# Patient Record
Sex: FEMALE | Race: WHITE | Employment: FULL TIME | URBAN - METROPOLITAN AREA
[De-identification: names, ages, dates, MRNs, and addresses within clinical notes are randomized per-mention and may not be internally consistent; named-entity substitution may affect disease eponyms.]

---

## 2017-01-12 ENCOUNTER — HOSPITAL ENCOUNTER (OUTPATIENT)
Dept: ULTRASOUND IMAGING | Age: 42
Discharge: HOME | End: 2017-01-12
Attending: OBSTETRICS & GYNECOLOGY | Admitting: OBSTETRICS & GYNECOLOGY

## 2017-01-12 DIAGNOSIS — R92.8 ABNORMAL MAMMOGRAM, UNSPECIFIED: ICD-10-CM

## 2017-01-12 DIAGNOSIS — Z09 FOLLOW-UP EXAM, 3-6 MONTHS SINCE PREVIOUS EXAM: ICD-10-CM

## 2017-01-12 DIAGNOSIS — R92.8 ABNORMAL MAMMOGRAM: ICD-10-CM

## 2019-10-16 ENCOUNTER — HOSPITAL ENCOUNTER (OUTPATIENT)
Dept: WOMENS IMAGING | Age: 44
Discharge: HOME | End: 2019-10-16
Payer: COMMERCIAL

## 2019-10-16 DIAGNOSIS — Z12.31 BREAST CANCER SCREENING BY MAMMOGRAM: ICD-10-CM

## 2019-10-16 PROCEDURE — 77063 BREAST TOMOSYNTHESIS BI: CPT

## 2020-07-21 ENCOUNTER — OFFICE VISIT (OUTPATIENT)
Dept: INTERNAL MEDICINE CLINIC | Age: 45
End: 2020-07-21
Payer: COMMERCIAL

## 2020-07-21 VITALS
BODY MASS INDEX: 34.86 KG/M2 | HEIGHT: 68 IN | SYSTOLIC BLOOD PRESSURE: 138 MMHG | WEIGHT: 230 LBS | HEART RATE: 86 BPM | DIASTOLIC BLOOD PRESSURE: 92 MMHG | TEMPERATURE: 98.5 F

## 2020-07-21 PROCEDURE — 99203 OFFICE O/P NEW LOW 30 MIN: CPT | Performed by: NURSE PRACTITIONER

## 2020-07-21 RX ORDER — SERTRALINE HYDROCHLORIDE 100 MG/1
200 TABLET, FILM COATED ORAL DAILY
Qty: 180 TABLET | Refills: 1 | Status: SHIPPED | OUTPATIENT
Start: 2020-07-21 | End: 2021-04-01 | Stop reason: SDUPTHER

## 2020-07-21 RX ORDER — ESCITALOPRAM OXALATE 20 MG/1
20 TABLET ORAL DAILY
COMMUNITY
End: 2021-03-29

## 2020-07-21 SDOH — HEALTH STABILITY: MENTAL HEALTH: HOW OFTEN DO YOU HAVE A DRINK CONTAINING ALCOHOL?: 2-3 TIMES A WEEK

## 2020-07-21 ASSESSMENT — PATIENT HEALTH QUESTIONNAIRE - PHQ9
2. FEELING DOWN, DEPRESSED OR HOPELESS: 0
1. LITTLE INTEREST OR PLEASURE IN DOING THINGS: 0
SUM OF ALL RESPONSES TO PHQ QUESTIONS 1-9: 0
SUM OF ALL RESPONSES TO PHQ9 QUESTIONS 1 & 2: 0
SUM OF ALL RESPONSES TO PHQ QUESTIONS 1-9: 0

## 2020-07-21 NOTE — PROGRESS NOTES
is no abdominal tenderness. There is no guarding or rebound. Musculoskeletal: Normal range of motion. Skin:     General: Skin is warm and dry. Neurological:      Mental Status: She is alert and oriented to person, place, and time. Psychiatric:         Behavior: Behavior normal.         Thought Content: Thought content normal.        BP (!) 138/92   Pulse 86   Temp 98.5 °F (36.9 °C) (Oral)   Ht 5' 7.5\" (1.715 m)   Wt 230 lb (104.3 kg)   BMI 35.49 kg/m²        PHQ Scores 7/21/2020   PHQ2 Score 0   PHQ9 Score 0     Interpretation of Total Score DepressionSeverity: 1-4 = Minimal depression, 5-9 = Mild depression, 10-14 = Moderate depression, 15-19 = Moderately severe depression, 20-27 = Severe depression         ASSESSMENT/PLAN:  Bhavya was seen today for established new doctor. Diagnoses and all orders for this visit:    Encounter to establish care  - Oriented to provider & practice  - Will try to get records from previous PCP    Mixed obsessional thoughts and acts  -     sertraline (ZOLOFT) 100 MG tablet;  Take 2 tablets by mouth daily        LUZ MARIA Esteves - CNP

## 2020-07-22 ASSESSMENT — ENCOUNTER SYMPTOMS
RESPIRATORY NEGATIVE: 1
GASTROINTESTINAL NEGATIVE: 1

## 2020-08-25 ENCOUNTER — HOSPITAL ENCOUNTER (EMERGENCY)
Age: 45
Discharge: HOME | End: 2020-08-25
Payer: COMMERCIAL

## 2020-08-25 VITALS
WEIGHT: 201 LBS | OXYGEN SATURATION: 92 % | DIASTOLIC BLOOD PRESSURE: 101 MMHG | HEART RATE: 93 BPM | RESPIRATION RATE: 18 BRPM | BODY MASS INDEX: 30.46 KG/M2 | SYSTOLIC BLOOD PRESSURE: 154 MMHG | HEIGHT: 68 IN | TEMPERATURE: 98.3 F

## 2020-08-25 PROCEDURE — 99282 EMERGENCY DEPT VISIT SF MDM: CPT

## 2020-08-25 RX ORDER — BACITRACIN, NEOMYCIN, POLYMYXIN B 400; 3.5; 5 [USP'U]/G; MG/G; [USP'U]/G
OINTMENT TOPICAL
Status: DISCONTINUED
Start: 2020-08-25 | End: 2020-08-25 | Stop reason: HOSPADM

## 2020-08-25 ASSESSMENT — PAIN SCALES - GENERAL: PAINLEVEL_OUTOF10: 2

## 2020-08-26 NOTE — ED PROVIDER NOTES
[facility]        Pt Name: Joanna Lee  MRN: [id number]  [place] 1975  Date of evaluation: 8/25/2020  Provider: Nely Mccoy PA-C  PCP: LUZ MARIA Bryan CNP    Evaluation by VIRY. My supervising physician was available for consultation. CHIEF COMPLAINT       Chief Complaint   Patient presents with    Laceration     Pt presents to the ED with right index finger and hand laceration after accidently cutting self on kitchen blade        HISTORY OF PRESENT ILLNESS   (Location, Timing/Onset, Context/Setting, Quality, Duration, Modifying Factors, Severity, Associated Signs and Symptoms)  Note limiting factors. Joanna Lee is a 50 y.o. female that presents to the emergency department with a chief complaint of a laceration to her right hand. She just got a new ninja  and states that she was cleaning it and accidentally touched the blade when she was pulling it out of the sink. She states she is up-to-date on her tetanus but is unsure of exactly what it is but no she is always up-to-date on this. She is right-hand dominant. Denies any other wound besides of her her right hand. Denies numbness or any other symptoms. Rates the pain a 2 out of 10. Nursing Notes were all reviewed and agreed with or any disagreements were addressed in the HPI. REVIEW OF SYSTEMS    (2-9 systems for level 4, 10 or more for level 5)     Review of Systems    Positives and Pertinent negatives as per HPI. Except as noted above in the ROS, all other systems were reviewed and negative.        PAST MEDICAL HISTORY     Past Medical History:   Diagnosis Date    Cancer ([address]     skin    Fibroid uterus     Obsessive compulsive disorder          SURGICAL HISTORY     Past Surgical History:   Procedure Laterality Date    FRACTURE SURGERY      right ankle and toe    HYSTERECTOMY  09/09/2016    Robotic total hysterectomy, L salpingectomy & R salpingectomy-oopherectomy    SKIN CANCER EXCISION           CURRENTMEDICATIONS       Discharge Medication List as of 8/25/2020  9:00 PM      CONTINUE these medications which have NOT CHANGED    Details   sertraline (ZOLOFT) 100 MG tablet Take 2 tablets by mouth daily, Disp-180 tablet,R-1Normal      escitalopram (LEXAPRO) 20 MG tablet Take 20 mg by mouth dailyHistorical Med               ALLERGIES     Morphine and Keflex [cephalexin]    FAMILYHISTORY       Family History   Problem Relation Age of Onset    Diabetes Mother     High Blood Pressure Mother     Cancer Mother         breast    Diabetes Father           SOCIAL HISTORY       Social History     Tobacco Use    Smoking status: Never Smoker    Smokeless tobacco: Never Used   Substance Use Topics    Alcohol use: Yes     Frequency: 2-3 times a week     Comment: soc    Drug use: No       SCREENINGS             PHYSICAL EXAM    (up to 7 for level 4, 8 or more for level 5)     ED Triage Vitals   BP Temp Temp Source Pulse Resp SpO2 Height Weight   08/25/20 1951 08/25/20 1949 08/25/20 1949 08/25/20 1949 08/25/20 1949 08/25/20 1949 08/25/20 1949 08/25/20 1949   (!) 154/101 98.3 °F (36.8 °C) Oral 93 18 92 % 5' 8\" (1.727 m) 201 lb (91.2 kg)       Physical Exam  Vitals signs and nursing note reviewed. Constitutional:       Appearance: She is well-developed. She is not diaphoretic. HENT:      Head: Atraumatic. Nose: Nose normal.   Eyes:      General:         Right eye: No discharge. Left eye: No discharge. Neck:      Musculoskeletal: Normal range of motion. Cardiovascular:      Rate and Rhythm: Normal rate. Musculoskeletal: Normal range of motion. Comments: There is a 3.1 cm laceration on the palmar aspect of the right hand that extends over the area of the distal right second metacarpal over the MCP and into the area of the right second proximal phalanges.   No obvious tendon laceration or foreign body in a bloodless field and full range of motion. Full range of motion against resistance with right second DIP, PIP, MCP. Capillary refill brisk. Skin:     General: Skin is warm and dry. Findings: No erythema or rash. Neurological:      Mental Status: She is alert and oriented to person, place, and time. Cranial Nerves: No cranial nerve deficit. Psychiatric:         Behavior: Behavior normal.         DIAGNOSTIC RESULTS   LABS:    Labs Reviewed - No data to display    All other labs were within normal range or not returned as of this dictation. EKG: All EKG's are interpreted by the Emergency Department Physician in the absence of a cardiologist.  Please see their note for interpretation of EKG. RADIOLOGY:   Non-plain film images such as CT, Ultrasound and MRI are read by the radiologist. Plain radiographic images are visualized and preliminarily interpreted by the ED Provider with the below findings:        Interpretation per the Radiologist below, if available at the time of this note:    No orders to display     No results found. PROCEDURES   Unless otherwise noted below, none     Lac Repair  Performed by: Kulwinder Meehan PA-C  Authorized by: Kulwinder Meehan PA-C     Consent:     Consent obtained:  Verbal    Consent given by:  Patient    Risks discussed:  Infection, pain, retained foreign body, tendon damage, poor cosmetic result, need for additional repair and poor wound healing  Anesthesia (see MAR for exact dosages):      Anesthesia method:  Local infiltration    Local anesthetic:  Lidocaine 1% w/o epi  Laceration details:     Location:  Hand    Hand location:  R palm    Length (cm):  3.1  Repair type:     Repair type:  Simple  Pre-procedure details:     Preparation:  Patient was prepped and draped in usual sterile fashion  Exploration:     Wound exploration: wound explored through full range of motion and entire depth of wound probed and visualized    Treatment:     Area cleansed with: Saline    Amount of cleaning:  Standard    Irrigation solution:  Sterile saline  Skin repair:     Repair method:  Sutures    Suture size:  5-0    Suture material:  Nylon    Suture technique:  Simple interrupted    Number of sutures:  5  Approximation:     Approximation:  Close  Post-procedure details:     Dressing: Wound care with antibiotic ointment and sterile dressing provided by nursing staff. Patient tolerance of procedure: Tolerated well, no immediate complications        CRITICAL CARE TIME   N/A    CONSULTS:  None      EMERGENCY DEPARTMENT COURSE and DIFFERENTIAL DIAGNOSIS/MDM:   Vitals:    Vitals:    08/25/20 1949 08/25/20 1951   BP:  (!) 154/101   Pulse: 93    Resp: 18    Temp: 98.3 °F (36.8 °C)    TempSrc: Oral    SpO2: 92%    Weight: 201 lb (91.2 kg)    Height: 5' 8\" (1.727 m)        Patient was given the following medications:  Medications   neomycin-bacitracin-polymyxin (NEOSPORIN) 400-5-5000 ointment (has no administration in time range)           Patient presented with laceration of the right hand. Low suspicion for foreign body, tendon laceration, acute fracture or other emergent etiology. Wound easily approximated with simple sutures. She was educated to have these removed in about 1 week and educated on suture care at home. Will follow-up as an outpatient return here for any worsening of symptoms or problems at home. Patient was stable time of discharge. FINAL IMPRESSION      1.  Laceration of right hand without foreign body, initial encounter          DISPOSITION/PLAN   DISPOSITION Decision To Discharge 08/25/2020 08:57:38 PM      PATIENT REFERREDTO:  LUZ MARIA Hedrick - CNP  [facility]  [phone]    Schedule an appointment as soon as possible for a visit in 1 week  For suture removal    [facility] Emergency Department  [address] [place] [address]  [phone]    As needed      DISCHARGE MEDICATIONS:  Discharge Medication List as of 8/25/2020  9:00 PM          DISCONTINUED MEDICATIONS:  Discharge Medication List as of 8/25/2020  9:00 PM                 (Please note that portions of this note were completed with a voice recognition program.  Efforts were made to edit the dictations but occasionally words are mis-transcribed.)    Jessica Magallanes PA-C (electronically signed)           Jessica Magallanes PA-C  08/25/20 [id number]

## 2020-11-19 ENCOUNTER — OFFICE VISIT (OUTPATIENT)
Dept: PRIMARY CARE CLINIC | Age: 45
End: 2020-11-19
Payer: COMMERCIAL

## 2020-11-19 PROCEDURE — 99211 OFF/OP EST MAY X REQ PHY/QHP: CPT | Performed by: NURSE PRACTITIONER

## 2020-11-19 NOTE — PROGRESS NOTES
Bhavya Wilburn received a viral test for COVID-19. They were educated on isolation and quarantine as appropriate. For any symptoms, they were directed to seek care from their PCP, given contact information to establish with a doctor, directed to an urgent care or the emergency room.

## 2020-11-19 NOTE — PATIENT INSTRUCTIONS

## 2020-11-20 LAB — SARS-COV-2, PCR: NOT DETECTED

## 2021-03-29 ENCOUNTER — OFFICE VISIT (OUTPATIENT)
Dept: INTERNAL MEDICINE CLINIC | Age: 46
End: 2021-03-29
Payer: COMMERCIAL

## 2021-03-29 VITALS
TEMPERATURE: 97.5 F | WEIGHT: 243 LBS | BODY MASS INDEX: 36.95 KG/M2 | DIASTOLIC BLOOD PRESSURE: 80 MMHG | SYSTOLIC BLOOD PRESSURE: 144 MMHG | HEART RATE: 80 BPM | OXYGEN SATURATION: 98 %

## 2021-03-29 DIAGNOSIS — Z00.00 ANNUAL PHYSICAL EXAM: Primary | ICD-10-CM

## 2021-03-29 DIAGNOSIS — E66.09 CLASS 2 OBESITY DUE TO EXCESS CALORIES WITHOUT SERIOUS COMORBIDITY WITH BODY MASS INDEX (BMI) OF 36.0 TO 36.9 IN ADULT: ICD-10-CM

## 2021-03-29 DIAGNOSIS — Z80.3 FAMILY HISTORY OF BREAST CANCER: ICD-10-CM

## 2021-03-29 DIAGNOSIS — G47.30 SLEEP APNEA, UNSPECIFIED TYPE: ICD-10-CM

## 2021-03-29 DIAGNOSIS — Z12.31 ENCOUNTER FOR SCREENING MAMMOGRAM FOR MALIGNANT NEOPLASM OF BREAST: ICD-10-CM

## 2021-03-29 PROCEDURE — 99396 PREV VISIT EST AGE 40-64: CPT | Performed by: NURSE PRACTITIONER

## 2021-03-29 ASSESSMENT — PATIENT HEALTH QUESTIONNAIRE - PHQ9
SUM OF ALL RESPONSES TO PHQ9 QUESTIONS 1 & 2: 1
2. FEELING DOWN, DEPRESSED OR HOPELESS: 0
1. LITTLE INTEREST OR PLEASURE IN DOING THINGS: 1
SUM OF ALL RESPONSES TO PHQ QUESTIONS 1-9: 1

## 2021-03-29 NOTE — PROGRESS NOTES
SUBJECTIVE:    Patient ID: Lucyann Aschoff is a 48 y.o. female. CC: Annual physical, possible sleep apnea, obesity    HPI: The patient presents to the office today for annual physical examination and follow-up of chronic medical conditions. She is interested in having her annual blood work done today. She is concerned about possible sleep apnea. She reports her  has noticed periods of her stop and sleeping and snoring at night. She has a history of obesity. She is interested in weight loss options. Past Medical History:   Diagnosis Date    [address] ([facility] [address]     skin    Fibroid uterus     Obsessive compulsive disorder         Current Outpatient Medications   Medication Sig Dispense Refill    sertraline (ZOLOFT) 100 MG tablet Take 2 tablets by mouth daily 180 tablet 1     No current facility-administered medications for this visit. Review of Systems   Constitutional: Negative. Respiratory: Negative. Cardiovascular: Negative. Genitourinary: Negative. Musculoskeletal: Negative. Neurological: Negative. Psychiatric/Behavioral: Negative. All other systems reviewed and are negative. OBJECTIVE:  Physical Exam  Vitals signs reviewed. Constitutional:       General: She is not in acute distress. Appearance: She is well-developed. She is not diaphoretic. HENT:      Head: Normocephalic and atraumatic. Eyes:      General: No scleral icterus. Conjunctiva/sclera: Conjunctivae normal.   Neck:      Musculoskeletal: Neck supple. Vascular: No JVD. Cardiovascular:      Rate and Rhythm: Normal rate and regular rhythm. Pulmonary:      Effort: Pulmonary effort is normal. No respiratory distress. Breath sounds: Normal breath sounds. No wheezing. Abdominal:      General: There is no distension. Palpations: Abdomen is soft. Tenderness: There is no abdominal tenderness. There is no guarding or rebound.    Musculoskeletal: Normal range of motion. Skin:     General: Skin is warm and dry. Neurological:      Mental Status: She is alert and oriented to person, place, and time. Psychiatric:         Behavior: Behavior normal.         Thought Content: Thought content normal.        BP (!) 144/80 (Cuff Size: Large Adult)   Pulse 80   Temp 97.5 °F (36.4 °C)   Wt 243 lb (110.2 kg)   LMP 08/20/2016   SpO2 98%   BMI 36.95 kg/m²      PHQ Scores 3/29/2021 7/21/2020   PHQ2 Score 1 0   PHQ9 Score 1 0     Interpretation of Total Score Depression Severity: 1-4 = Minimal depression, 5-9 = Mild depression, 10-14 = Moderate depression, 15-19 = Moderately severe depression, 20-27 =Severe depression        ASSESSMENT/PLAN:  Bhavya was seen today for sleep apnea and weight loss. Diagnoses and all orders for this visit:    Annual physical exam  -     COMPREHENSIVE METABOLIC PANEL; Future  -     LIPID PANEL; Future  -     Hemoglobin A1C; Future  -     TSH with Reflex; Future  -     QUINTON DIGITAL SCREEN W OR WO CAD BILATERAL; Future  -     CBC WITH AUTO DIFFERENTIAL; Future  -     HIV Screen; Future  -     Hepatitis C Antibody; Future    Sleep apnea, unspecified type  -     Jeanie Kingsley MD, Sleep Medicine, [facility]    Class 2 obesity due to excess calories without serious comorbidity with body mass index (BMI) of 36.0 to 36.9 in adult  -     Jeanie Kingsley MD, Sleep Medicine, [facility]    Encounter for screening mammogram for malignant neoplasm of breast  -     QUINTON DIGITAL SCREEN W OR WO CAD BILATERAL; Future    Family history of breast cancer  -     QUINTON DIGITAL SCREEN W OR WO CAD BILATERAL;  Future        LUZ MARIA Apple - CNP

## 2021-03-31 PROBLEM — E66.812 CLASS 2 OBESITY DUE TO EXCESS CALORIES WITHOUT SERIOUS COMORBIDITY WITH BODY MASS INDEX (BMI) OF 36.0 TO 36.9 IN ADULT: Status: ACTIVE | Noted: 2021-03-31

## 2021-03-31 PROBLEM — G47.30 SLEEP APNEA: Status: ACTIVE | Noted: 2021-03-31

## 2021-03-31 PROBLEM — E66.09 CLASS 2 OBESITY DUE TO EXCESS CALORIES WITHOUT SERIOUS COMORBIDITY WITH BODY MASS INDEX (BMI) OF 36.0 TO 36.9 IN ADULT: Status: ACTIVE | Noted: 2021-03-31

## 2021-03-31 ASSESSMENT — ENCOUNTER SYMPTOMS: RESPIRATORY NEGATIVE: 1

## 2021-04-01 DIAGNOSIS — F42.2 MIXED OBSESSIONAL THOUGHTS AND ACTS: ICD-10-CM

## 2021-04-01 RX ORDER — SERTRALINE HYDROCHLORIDE 100 MG/1
200 TABLET, FILM COATED ORAL DAILY
Qty: 180 TABLET | Refills: 1 | Status: SHIPPED | OUTPATIENT
Start: 2021-04-01 | End: 2021-11-23

## 2021-04-02 DIAGNOSIS — Z00.00 ANNUAL PHYSICAL EXAM: ICD-10-CM

## 2021-04-02 LAB
A/G RATIO: 2.1 (ref 1.1–2.2)
ALBUMIN SERPL-MCNC: 4.8 G/DL (ref 3.4–5)
ALP BLD-CCNC: 71 U/L (ref 40–129)
ALT SERPL-CCNC: 27 U/L (ref 10–40)
ANION GAP SERPL CALCULATED.3IONS-SCNC: 11 MMOL/L (ref 3–16)
AST SERPL-CCNC: 17 U/L (ref 15–37)
BASOPHILS ABSOLUTE: 0.1 K/UL (ref 0–0.2)
BASOPHILS RELATIVE PERCENT: 0.9 %
BILIRUB SERPL-MCNC: 0.8 MG/DL (ref 0–1)
BUN BLDV-MCNC: 7 MG/DL (ref 7–20)
CALCIUM SERPL-MCNC: 9.7 MG/DL (ref 8.3–10.6)
CHLORIDE BLD-SCNC: 102 MMOL/L (ref 99–110)
CHOLESTEROL, TOTAL: 165 MG/DL (ref 0–199)
CO2: 29 MMOL/L (ref 21–32)
CREAT SERPL-MCNC: 0.7 MG/DL (ref 0.6–1.1)
EOSINOPHILS ABSOLUTE: 0.2 K/UL (ref 0–0.6)
EOSINOPHILS RELATIVE PERCENT: 1.9 %
GFR AFRICAN AMERICAN: >60
GFR NON-AFRICAN AMERICAN: >60
GLOBULIN: 2.3 G/DL
GLUCOSE BLD-MCNC: 95 MG/DL (ref 70–99)
HCT VFR BLD CALC: 44.8 % (ref 36–48)
HDLC SERPL-MCNC: 53 MG/DL (ref 40–60)
HEMOGLOBIN: 15 G/DL (ref 12–16)
HEPATITIS C ANTIBODY INTERPRETATION: NORMAL
LDL CHOLESTEROL CALCULATED: 92 MG/DL
LYMPHOCYTES ABSOLUTE: 1.6 K/UL (ref 1–5.1)
LYMPHOCYTES RELATIVE PERCENT: 20.2 %
MCH RBC QN AUTO: 31.8 PG (ref 26–34)
MCHC RBC AUTO-ENTMCNC: 33.6 G/DL (ref 31–36)
MCV RBC AUTO: 94.7 FL (ref 80–100)
MONOCYTES ABSOLUTE: 0.6 K/UL (ref 0–1.3)
MONOCYTES RELATIVE PERCENT: 7.4 %
NEUTROPHILS ABSOLUTE: 5.6 K/UL (ref 1.7–7.7)
NEUTROPHILS RELATIVE PERCENT: 69.6 %
PDW BLD-RTO: 13.3 % (ref 12.4–15.4)
PLATELET # BLD: 258 K/UL (ref 135–450)
PMV BLD AUTO: 9 FL (ref 5–10.5)
POTASSIUM SERPL-SCNC: 4.3 MMOL/L (ref 3.5–5.1)
RBC # BLD: 4.74 M/UL (ref 4–5.2)
SODIUM BLD-SCNC: 142 MMOL/L (ref 136–145)
TOTAL PROTEIN: 7.1 G/DL (ref 6.4–8.2)
TRIGL SERPL-MCNC: 100 MG/DL (ref 0–150)
TSH REFLEX: 3.54 UIU/ML (ref 0.27–4.2)
VLDLC SERPL CALC-MCNC: 20 MG/DL
WBC # BLD: 8 K/UL (ref 4–11)

## 2021-04-03 LAB
ESTIMATED AVERAGE GLUCOSE: 91.1 MG/DL
HBA1C MFR BLD: 4.8 %
HIV AG/AB: NORMAL
HIV ANTIGEN: NORMAL
HIV-1 ANTIBODY: NORMAL
HIV-2 AB: NORMAL

## 2021-06-08 ENCOUNTER — TELEPHONE (OUTPATIENT)
Dept: PULMONOLOGY | Age: 46
End: 2021-06-08

## 2021-07-13 ENCOUNTER — HOSPITAL ENCOUNTER (OUTPATIENT)
Dept: WOMENS IMAGING | Age: 46
Discharge: HOME | End: 2021-07-13
Payer: COMMERCIAL

## 2021-07-13 DIAGNOSIS — Z80.3 FAMILY HISTORY OF BREAST CANCER: ICD-10-CM

## 2021-07-13 DIAGNOSIS — Z12.31 ENCOUNTER FOR SCREENING MAMMOGRAM FOR MALIGNANT NEOPLASM OF BREAST: ICD-10-CM

## 2021-07-13 DIAGNOSIS — Z00.00 ANNUAL PHYSICAL EXAM: ICD-10-CM

## 2021-07-13 PROCEDURE — 77063 BREAST TOMOSYNTHESIS BI: CPT

## 2022-02-21 ENCOUNTER — E-VISIT (OUTPATIENT)
Dept: INTERNAL MEDICINE CLINIC | Age: 47
End: 2022-02-21
Payer: COMMERCIAL

## 2022-02-21 DIAGNOSIS — J06.9 UPPER RESPIRATORY TRACT INFECTION, UNSPECIFIED TYPE: Primary | ICD-10-CM

## 2022-02-21 PROCEDURE — 99421 OL DIG E/M SVC 5-10 MIN: CPT | Performed by: NURSE PRACTITIONER

## 2022-02-21 ASSESSMENT — LIFESTYLE VARIABLES: SMOKING_STATUS: NO, I'VE NEVER SMOKED

## 2022-02-21 NOTE — PROGRESS NOTES
URI symptoms for 3 days. No fever. Neg COVID testing per her report. Most likely viral illness. Dx: URI    Rx: Treat your symptoms. Rest, fluids, Tylenol cold and cough, salt water gargles, sinus rinses, etc.     If symptoms are persisting for more than 7 days, please let me know and we can try antibiotics for bacterial infection but I would not suggest that at this point as they will not help a virus.       Time spend on e-visit encounter: 8m

## 2022-04-29 ENCOUNTER — OFFICE VISIT (OUTPATIENT)
Dept: INTERNAL MEDICINE CLINIC | Age: 47
End: 2022-04-29
Payer: COMMERCIAL

## 2022-04-29 VITALS
HEIGHT: 68 IN | DIASTOLIC BLOOD PRESSURE: 80 MMHG | HEART RATE: 84 BPM | OXYGEN SATURATION: 98 % | BODY MASS INDEX: 42.59 KG/M2 | WEIGHT: 281 LBS | SYSTOLIC BLOOD PRESSURE: 132 MMHG

## 2022-04-29 DIAGNOSIS — E66.01 CLASS 3 SEVERE OBESITY DUE TO EXCESS CALORIES WITHOUT SERIOUS COMORBIDITY WITH BODY MASS INDEX (BMI) OF 40.0 TO 44.9 IN ADULT (HCC): ICD-10-CM

## 2022-04-29 DIAGNOSIS — Z00.00 ANNUAL PHYSICAL EXAM: ICD-10-CM

## 2022-04-29 DIAGNOSIS — F42.2 MIXED OBSESSIONAL THOUGHTS AND ACTS: ICD-10-CM

## 2022-04-29 DIAGNOSIS — Z00.00 ANNUAL PHYSICAL EXAM: Primary | ICD-10-CM

## 2022-04-29 LAB
A/G RATIO: 2.2 (ref 1.1–2.2)
ALBUMIN SERPL-MCNC: 4.6 G/DL (ref 3.4–5)
ALP BLD-CCNC: 69 U/L (ref 40–129)
ALT SERPL-CCNC: 38 U/L (ref 10–40)
ANION GAP SERPL CALCULATED.3IONS-SCNC: 15 MMOL/L (ref 3–16)
AST SERPL-CCNC: 19 U/L (ref 15–37)
BASOPHILS ABSOLUTE: 0.1 K/UL (ref 0–0.2)
BASOPHILS RELATIVE PERCENT: 0.8 %
BILIRUB SERPL-MCNC: 0.3 MG/DL (ref 0–1)
BUN BLDV-MCNC: 14 MG/DL (ref 7–20)
CALCIUM SERPL-MCNC: 10 MG/DL (ref 8.3–10.6)
CHLORIDE BLD-SCNC: 104 MMOL/L (ref 99–110)
CHOLESTEROL, TOTAL: 172 MG/DL (ref 0–199)
CO2: 23 MMOL/L (ref 21–32)
CREAT SERPL-MCNC: 0.8 MG/DL (ref 0.6–1.1)
EOSINOPHILS ABSOLUTE: 0.2 K/UL (ref 0–0.6)
EOSINOPHILS RELATIVE PERCENT: 3.4 %
GFR AFRICAN AMERICAN: >60
GFR NON-AFRICAN AMERICAN: >60
GLUCOSE BLD-MCNC: 104 MG/DL (ref 70–99)
HCT VFR BLD CALC: 41.3 % (ref 36–48)
HDLC SERPL-MCNC: 35 MG/DL (ref 40–60)
HEMOGLOBIN: 14 G/DL (ref 12–16)
LDL CHOLESTEROL CALCULATED: 105 MG/DL
LYMPHOCYTES ABSOLUTE: 1.7 K/UL (ref 1–5.1)
LYMPHOCYTES RELATIVE PERCENT: 27 %
MCH RBC QN AUTO: 30.7 PG (ref 26–34)
MCHC RBC AUTO-ENTMCNC: 33.9 G/DL (ref 31–36)
MCV RBC AUTO: 90.6 FL (ref 80–100)
MONOCYTES ABSOLUTE: 0.4 K/UL (ref 0–1.3)
MONOCYTES RELATIVE PERCENT: 6.3 %
NEUTROPHILS ABSOLUTE: 4 K/UL (ref 1.7–7.7)
NEUTROPHILS RELATIVE PERCENT: 62.5 %
PDW BLD-RTO: 13.6 % (ref 12.4–15.4)
PLATELET # BLD: 259 K/UL (ref 135–450)
PMV BLD AUTO: 8.4 FL (ref 5–10.5)
POTASSIUM SERPL-SCNC: 4.7 MMOL/L (ref 3.5–5.1)
RBC # BLD: 4.56 M/UL (ref 4–5.2)
SODIUM BLD-SCNC: 142 MMOL/L (ref 136–145)
TOTAL PROTEIN: 6.7 G/DL (ref 6.4–8.2)
TRIGL SERPL-MCNC: 161 MG/DL (ref 0–150)
TSH REFLEX: 3.28 UIU/ML (ref 0.27–4.2)
VLDLC SERPL CALC-MCNC: 32 MG/DL
WBC # BLD: 6.4 K/UL (ref 4–11)

## 2022-04-29 PROCEDURE — 99396 PREV VISIT EST AGE 40-64: CPT | Performed by: NURSE PRACTITIONER

## 2022-04-29 RX ORDER — BUPROPION HYDROCHLORIDE 150 MG/1
150 TABLET, EXTENDED RELEASE ORAL 2 TIMES DAILY
Qty: 60 TABLET | Refills: 5 | Status: SHIPPED
Start: 2022-04-29 | End: 2022-06-03 | Stop reason: DRUGHIGH

## 2022-04-29 ASSESSMENT — PATIENT HEALTH QUESTIONNAIRE - PHQ9
SUM OF ALL RESPONSES TO PHQ QUESTIONS 1-9: 13
4. FEELING TIRED OR HAVING LITTLE ENERGY: 2
SUM OF ALL RESPONSES TO PHQ QUESTIONS 1-9: 13
2. FEELING DOWN, DEPRESSED OR HOPELESS: 2
6. FEELING BAD ABOUT YOURSELF - OR THAT YOU ARE A FAILURE OR HAVE LET YOURSELF OR YOUR FAMILY DOWN: 2
1. LITTLE INTEREST OR PLEASURE IN DOING THINGS: 2
10. IF YOU CHECKED OFF ANY PROBLEMS, HOW DIFFICULT HAVE THESE PROBLEMS MADE IT FOR YOU TO DO YOUR WORK, TAKE CARE OF THINGS AT HOME, OR GET ALONG WITH OTHER PEOPLE: 2
8. MOVING OR SPEAKING SO SLOWLY THAT OTHER PEOPLE COULD HAVE NOTICED. OR THE OPPOSITE, BEING SO FIGETY OR RESTLESS THAT YOU HAVE BEEN MOVING AROUND A LOT MORE THAN USUAL: 1
7. TROUBLE CONCENTRATING ON THINGS, SUCH AS READING THE NEWSPAPER OR WATCHING TELEVISION: 0
SUM OF ALL RESPONSES TO PHQ QUESTIONS 1-9: 13
5. POOR APPETITE OR OVEREATING: 2
3. TROUBLE FALLING OR STAYING ASLEEP: 2
SUM OF ALL RESPONSES TO PHQ QUESTIONS 1-9: 13
9. THOUGHTS THAT YOU WOULD BE BETTER OFF DEAD, OR OF HURTING YOURSELF: 0
SUM OF ALL RESPONSES TO PHQ9 QUESTIONS 1 & 2: 4

## 2022-04-29 NOTE — PROGRESS NOTES
SUBJECTIVE:    Patient ID: Davion Olivarez is a 50 y.o. female. CC: Annual physical, weight gain, poor mood    HPI: The patient presents to the office today for annual physical examination and with concerns about weight gain. She also reports poor mood. She reports weight gain since her hysterectomy. She admits she is not exercising. She has tried numerous weight loss techniques including Noom, Adipex, weight watchers, high protein/low carb diet. She admits to poor mood. She is taking sertraline. She feels her mood is largely driven by her weight. Past Medical History:   Diagnosis Date    [address] [address]     skin    Fibroid uterus     Obsessive compulsive disorder         Current Outpatient Medications   Medication Sig Dispense Refill    sertraline (ZOLOFT) 100 MG tablet TAKE TWO TABLETS BY MOUTH DAILY 60 tablet 5     No current facility-administered medications for this visit. Review of Systems   Constitutional: Positive for unexpected weight change. Negative for chills and fever. Respiratory: Negative. Cardiovascular: Negative. Gastrointestinal: Negative. Genitourinary: Negative. Musculoskeletal: Negative. Skin: Negative. Neurological: Negative. Psychiatric/Behavioral: Positive for dysphoric mood. OBJECTIVE:  Physical Exam  Vitals reviewed. Constitutional:       General: She is not in acute distress. Appearance: She is well-developed. She is not diaphoretic. HENT:      Head: Normocephalic and atraumatic. Eyes:      General: No scleral icterus. Conjunctiva/sclera: Conjunctivae normal.   Neck:      Vascular: No JVD. Cardiovascular:      Rate and Rhythm: Normal rate and regular rhythm. Pulmonary:      Effort: Pulmonary effort is normal. No respiratory distress. Breath sounds: Normal breath sounds. No wheezing. Abdominal:      General: There is no distension. Palpations: Abdomen is soft. Tenderness:  There is no abdominal tenderness. There is no guarding or rebound. Musculoskeletal:         General: Normal range of motion. Cervical back: Neck supple. Skin:     General: Skin is warm and dry. Neurological:      Mental Status: She is alert and oriented to person, place, and time. Psychiatric:         Behavior: Behavior normal.         Thought Content: Thought content normal.        /80   Pulse 84   Ht 5' 8\" (1.727 m)   Wt 281 lb (127.5 kg)   LMP 08/20/2016   SpO2 98%   BMI 42.73 kg/m²      PHQ Scores 4/29/2022 3/29/2021 7/21/2020   PHQ2 Score 4 1 0   PHQ9 Score 13 1 0     Interpretation of Total Score Depression Severity: 1-4 = Minimal depression, 5-9 = Mild depression, 10-14 = Moderate depression, 15-19 = Moderately severe depression, 20-27 =Severe depression      ASSESSMENT/PLAN:  Bhavya was seen today for weight loss. Diagnoses and all orders for this visit:    Annual physical exam  -     Comprehensive Metabolic Panel; Future  -     LIPID PANEL; Future  -     Hemoglobin A1C; Future  -     TSH with Reflex; Future  -     CBC with Auto Differential; Future  -     buPROPion (WELLBUTRIN SR) 150 MG extended release tablet; Take 1 tablet by mouth 2 times daily    Mixed obsessional thoughts and acts  -     Comprehensive Metabolic Panel; Future  -     LIPID PANEL; Future  -     Hemoglobin A1C; Future  -     TSH with Reflex; Future  -     CBC with Auto Differential; Future  -     Ambulatory referral to Psychology    Class 3 severe obesity due to excess calories without serious comorbidity with body mass index (BMI) of 40.0 to 44.9 in [facility])  -     Comprehensive Metabolic Panel; Future  -     LIPID PANEL; Future  -     Hemoglobin A1C; Future  -     TSH with Reflex; Future  -     CBC with Auto Differential; Future  -     buPROPion (WELLBUTRIN SR) 150 MG extended release tablet;  Take 1 tablet by mouth 2 times daily  -     Ambulatory referral to Psychology        LUZ MARIA Garner - CNP

## 2022-04-30 LAB
ESTIMATED AVERAGE GLUCOSE: 102.5 MG/DL
HBA1C MFR BLD: 5.2 %

## 2022-05-16 ASSESSMENT — ENCOUNTER SYMPTOMS
GASTROINTESTINAL NEGATIVE: 1
RESPIRATORY NEGATIVE: 1

## 2022-06-03 ENCOUNTER — OFFICE VISIT (OUTPATIENT)
Dept: INTERNAL MEDICINE CLINIC | Age: 47
End: 2022-06-03
Payer: COMMERCIAL

## 2022-06-03 VITALS
WEIGHT: 270 LBS | SYSTOLIC BLOOD PRESSURE: 136 MMHG | OXYGEN SATURATION: 98 % | HEIGHT: 68 IN | DIASTOLIC BLOOD PRESSURE: 88 MMHG | BODY MASS INDEX: 40.92 KG/M2 | HEART RATE: 81 BPM

## 2022-06-03 DIAGNOSIS — E66.01 CLASS 3 SEVERE OBESITY DUE TO EXCESS CALORIES WITHOUT SERIOUS COMORBIDITY WITH BODY MASS INDEX (BMI) OF 40.0 TO 44.9 IN ADULT (HCC): ICD-10-CM

## 2022-06-03 DIAGNOSIS — F33.0 MILD EPISODE OF RECURRENT MAJOR DEPRESSIVE DISORDER (HCC): Primary | ICD-10-CM

## 2022-06-03 PROCEDURE — 99213 OFFICE O/P EST LOW 20 MIN: CPT | Performed by: NURSE PRACTITIONER

## 2022-06-03 RX ORDER — BUPROPION HYDROCHLORIDE 300 MG/1
300 TABLET ORAL EVERY MORNING
Qty: 90 TABLET | Refills: 3 | Status: SHIPPED
Start: 2022-06-03 | End: 2022-09-20 | Stop reason: SINTOL

## 2022-06-03 SDOH — ECONOMIC STABILITY: FOOD INSECURITY: WITHIN THE PAST 12 MONTHS, THE FOOD YOU BOUGHT JUST DIDN'T LAST AND YOU DIDN'T HAVE MONEY TO GET MORE.: NEVER TRUE

## 2022-06-03 SDOH — ECONOMIC STABILITY: FOOD INSECURITY: WITHIN THE PAST 12 MONTHS, YOU WORRIED THAT YOUR FOOD WOULD RUN OUT BEFORE YOU GOT MONEY TO BUY MORE.: NEVER TRUE

## 2022-06-03 ASSESSMENT — SOCIAL DETERMINANTS OF HEALTH (SDOH): HOW HARD IS IT FOR YOU TO PAY FOR THE VERY BASICS LIKE FOOD, HOUSING, MEDICAL CARE, AND HEATING?: NOT HARD AT ALL

## 2022-06-03 NOTE — PROGRESS NOTES
SUBJECTIVE:    Patient ID: Rohini Wilkinson is a 50 y.o. female. CC: Obesity, depression    HPI: The patient presents to the office today for 6-week follow-up of depression and obesity. She was seen about 6 weeks ago for annual physical examination. At that time, she reported concerns about her mood and obesity. We discussed a trial of Wellbutrin as it could help with both mood and weight. She was interested in trying this. The patient reports improvement of both her mood and weight. She has lost 11 pounds in the past 6 weeks. She denies any side effects to Wellbutrin. She is interested in increasing the dose. Past Medical History:   Diagnosis Date    Cancer ([facility] [address]     skin    Fibroid uterus     Obsessive compulsive disorder         Current Outpatient Medications   Medication Sig Dispense Refill    buPROPion (WELLBUTRIN SR) 150 MG extended release tablet Take 1 tablet by mouth 2 times daily 60 tablet 5     No current facility-administered medications for this visit. Review of Systems   Constitutional: Negative. Respiratory: Negative. Cardiovascular: Negative. Gastrointestinal: Negative. Genitourinary: Negative. Musculoskeletal: Negative. Neurological: Negative. Psychiatric/Behavioral: Negative. OBJECTIVE:  Physical Exam  Constitutional:       Appearance: Normal appearance. HENT:      Head: Normocephalic and atraumatic. Pulmonary:      Effort: Pulmonary effort is normal. No respiratory distress. Skin:     General: Skin is warm and dry. Neurological:      General: No focal deficit present. Mental Status: She is alert and oriented to person, place, and time.    Psychiatric:         Mood and Affect: Mood normal.         Behavior: Behavior normal.        /88 (Site: Left Upper Arm, Position: Sitting, Cuff Size: Large Adult)   Pulse 81   Ht 5' 8\" (1.727 m)   Wt 270 lb (122.5 kg)   LMP 08/20/2016   SpO2 98%   Breastfeeding No   BMI 41.05 kg/m²      PHQ Scores 4/29/2022 3/29/2021 7/21/2020   PHQ2 Score 4 1 0   PHQ9 Score 13 1 0     Interpretation of Total Score Depression Severity: 1-4 = Minimal depression, 5-9 = Mild depression, 10-14 = Moderate depression, 15-19 = Moderately severe depression, 20-27 =Severe depression        Wt Readings from Last 3 Encounters:   06/03/22 270 lb (122.5 kg)   04/29/22 281 lb (127.5 kg)   03/29/21 243 lb (110.2 kg)         ASSESSMENT/PLAN:  Bhavya was seen today for follow-up and weight loss. Diagnoses and all orders for this visit:    Mild episode of recurrent major depressive disorder (HCC)  -     buPROPion (WELLBUTRIN XL) 300 MG extended release tablet; Take 1 tablet by mouth every morning    Class 3 severe obesity due to excess calories without serious comorbidity with body mass index (BMI) of 40.0 to 44.9 in adult (HCC)  -     buPROPion (WELLBUTRIN XL) 300 MG extended release tablet; Take 1 tablet by mouth every morning    -Improvement in mood on Wellbutrin. She would like to increase dose  -11 pound weight loss since starting Wellbutrin. She would like to increase dose    Increase Wellbutrin from 150 mg daily to 300 mg daily.         LUZ MARIA Beltran - CNP

## 2022-06-14 PROBLEM — E66.813 CLASS 3 SEVERE OBESITY DUE TO EXCESS CALORIES WITHOUT SERIOUS COMORBIDITY WITH BODY MASS INDEX (BMI) OF 40.0 TO 44.9 IN ADULT: Status: ACTIVE | Noted: 2021-03-31

## 2022-06-14 PROBLEM — E66.01 CLASS 3 SEVERE OBESITY DUE TO EXCESS CALORIES WITHOUT SERIOUS COMORBIDITY WITH BODY MASS INDEX (BMI) OF 40.0 TO 44.9 IN ADULT (HCC): Status: ACTIVE | Noted: 2021-03-31

## 2022-06-14 PROBLEM — F33.0 MILD EPISODE OF RECURRENT MAJOR DEPRESSIVE DISORDER (HCC): Status: ACTIVE | Noted: 2022-06-14

## 2022-06-14 ASSESSMENT — ENCOUNTER SYMPTOMS
RESPIRATORY NEGATIVE: 1
GASTROINTESTINAL NEGATIVE: 1

## 2022-06-22 ENCOUNTER — E-VISIT (OUTPATIENT)
Dept: INTERNAL MEDICINE CLINIC | Age: 47
End: 2022-06-22

## 2022-06-23 ENCOUNTER — OFFICE VISIT (OUTPATIENT)
Dept: INTERNAL MEDICINE CLINIC | Age: 47
End: 2022-06-23
Payer: COMMERCIAL

## 2022-06-23 ENCOUNTER — HOSPITAL ENCOUNTER (OUTPATIENT)
Dept: CT IMAGING | Age: 47
Discharge: HOME | End: 2022-06-23
Payer: COMMERCIAL

## 2022-06-23 VITALS
WEIGHT: 263 LBS | HEART RATE: 74 BPM | OXYGEN SATURATION: 98 % | BODY MASS INDEX: 39.99 KG/M2 | DIASTOLIC BLOOD PRESSURE: 88 MMHG | SYSTOLIC BLOOD PRESSURE: 138 MMHG

## 2022-06-23 DIAGNOSIS — R79.89 ELEVATED D-DIMER: ICD-10-CM

## 2022-06-23 DIAGNOSIS — R07.89 CHEST TIGHTNESS: ICD-10-CM

## 2022-06-23 DIAGNOSIS — R79.89 ELEVATED D-DIMER: Primary | ICD-10-CM

## 2022-06-23 DIAGNOSIS — R05.9 COUGH: ICD-10-CM

## 2022-06-23 DIAGNOSIS — R06.02 SOB (SHORTNESS OF BREATH): ICD-10-CM

## 2022-06-23 DIAGNOSIS — R06.2 WHEEZING: Primary | ICD-10-CM

## 2022-06-23 LAB
ALBUMIN SERPL-MCNC: 4.9 G/DL (ref 3.4–5)
ANION GAP SERPL CALCULATED.3IONS-SCNC: 11 MMOL/L (ref 3–16)
BUN BLDV-MCNC: 12 MG/DL (ref 7–20)
CALCIUM SERPL-MCNC: 9.9 MG/DL (ref 8.3–10.6)
CHLORIDE BLD-SCNC: 104 MMOL/L (ref 99–110)
CO2: 26 MMOL/L (ref 21–32)
CREAT SERPL-MCNC: 0.9 MG/DL (ref 0.6–1.1)
D DIMER: 0.75 UG/ML FEU (ref 0–0.6)
GFR AFRICAN AMERICAN: >60
GFR NON-AFRICAN AMERICAN: >60
GLUCOSE BLD-MCNC: 84 MG/DL (ref 70–99)
PHOSPHORUS: 3.1 MG/DL (ref 2.5–4.9)
POTASSIUM SERPL-SCNC: 4.2 MMOL/L (ref 3.5–5.1)
SODIUM BLD-SCNC: 141 MMOL/L (ref 136–145)

## 2022-06-23 PROCEDURE — 6360000004 HC RX CONTRAST MEDICATION: Performed by: NURSE PRACTITIONER

## 2022-06-23 PROCEDURE — 80069 RENAL FUNCTION PANEL: CPT

## 2022-06-23 PROCEDURE — 99214 OFFICE O/P EST MOD 30 MIN: CPT | Performed by: NURSE PRACTITIONER

## 2022-06-23 PROCEDURE — 71260 CT THORAX DX C+: CPT

## 2022-06-23 PROCEDURE — 36415 COLL VENOUS BLD VENIPUNCTURE: CPT

## 2022-06-23 RX ORDER — ALBUTEROL SULFATE 90 UG/1
2 AEROSOL, METERED RESPIRATORY (INHALATION) 4 TIMES DAILY PRN
Qty: 18 G | Refills: 0 | Status: SHIPPED | OUTPATIENT
Start: 2022-06-23 | End: 2022-09-20 | Stop reason: ALTCHOICE

## 2022-06-23 RX ORDER — PREDNISONE 20 MG/1
20 TABLET ORAL 2 TIMES DAILY
Qty: 10 TABLET | Refills: 0 | Status: SHIPPED | OUTPATIENT
Start: 2022-06-23 | End: 2022-06-28

## 2022-06-23 RX ADMIN — IOPAMIDOL 75 ML: 755 INJECTION, SOLUTION INTRAVENOUS at 15:18

## 2022-06-23 ASSESSMENT — ENCOUNTER SYMPTOMS
COUGH: 1
CHEST TIGHTNESS: 1
SHORTNESS OF BREATH: 1
WHEEZING: 1

## 2022-06-23 NOTE — PROGRESS NOTES
6/23/22     Chief Complaint   Patient presents with    Allergies     Cough and chest tightness 3-4 days. Trying zyrtec and OTC inhaler      HPI    Here for seasonal allergies and concern for asthma  Pt has cough, chest tightness and wheezing   Taking OTC zyrtec - which helps with allergy symptoms   Cough, chest tightness, mild / intermittent SOB and wheezing started in the past 4 days and feels like it is slowly progressing. Denies fever, chills, inspiratory pain, congestion, body aches, fatigue - feeling well otherwise. She reports a negative covid test   Denies any known exposures to flu, covid or other illness  Using primatine mist OTC - not seeing any relief with wheezing, SOB or cough. Took benadryl last night which helped some over night. Pt denies a history of asthma - Has noticed some intermittent wheezing and tightness over the past few months. Finding the need to take a deep breath with conversation more frequeently. Denies any changes to environment. Working on diet / exercise. Denies change in SOB with exercise. Denies a personal or family history of blood clots. Denies any recent travel, surgery. Not a smoker. Not on hormones. Allergies   Allergen Reactions    Morphine Itching    Keflex [Cephalexin] Diarrhea and Nausea And Vomiting     Current Outpatient Medications   Medication Sig Dispense Refill    predniSONE (DELTASONE) 20 MG tablet Take 1 tablet by mouth 2 times daily for 5 days 10 tablet 0    albuterol sulfate HFA (VENTOLIN HFA) 108 (90 Base) MCG/ACT inhaler Inhale 2 puffs into the lungs 4 times daily as needed for Wheezing 18 g 0    buPROPion (WELLBUTRIN XL) 300 MG extended release tablet Take 1 tablet by mouth every morning 90 tablet 3     No current facility-administered medications for this visit. Review of Systems   Constitutional: Negative for chills, fatigue and fever. Respiratory: Positive for cough, chest tightness, shortness of breath and wheezing. Cardiovascular: Negative for chest pain, palpitations and leg swelling. Neurological: Negative for dizziness, tremors, light-headedness and headaches. Vitals:    06/23/22 0918   BP: 138/88   Pulse: 74   SpO2: 98%   Weight: 263 lb (119.3 kg)      Physical Exam  Constitutional:       General: She is not in acute distress. Appearance: Normal appearance. She is obese. She is not ill-appearing. HENT:      Head: Normocephalic and atraumatic. Cardiovascular:      Rate and Rhythm: Normal rate and regular rhythm. Heart sounds: Normal heart sounds. Pulmonary:      Effort: Pulmonary effort is normal. No respiratory distress. Breath sounds: Wheezing (scattered) present. Comments: Mild conversational dyspnea. Recovers quickly without intervention. Musculoskeletal:      Right lower leg: No edema. Left lower leg: No edema. Skin:     General: Skin is warm and dry. Neurological:      General: No focal deficit present. Mental Status: She is alert and oriented to person, place, and time. Mental status is at baseline. Psychiatric:         Mood and Affect: Mood normal.         Behavior: Behavior normal.       Assessment/Plan:  Wheezing/ Chest tightness/ SOB (shortness of breath)/ Cough  Uncontrolled  Given symptoms without a history of asthma - checking D-dimer to r/o PE. If D-dimer is eleaved will check stat CT chest. If normal will complete steroids and use inhaler as needed. Discussed PFT if d-dimer is negative once current exacerbation has improved. - predniSONE (DELTASONE) 20 MG tablet; Take 1 tablet by mouth 2 times daily for 5 days  Dispense: 10 tablet; Refill: 0  - albuterol sulfate HFA (VENTOLIN HFA) 108 (90 Base) MCG/ACT inhaler; Inhale 2 puffs into the lungs 4 times daily as needed for Wheezing  Dispense: 18 g; Refill: 0  - D-Dimer, Quantitative; Future    Discussed medications with patient, who voiced understanding of their use and indications.  All questions answered. Strict criteria for follow up and when to seek emergency medical attention.      Electronically signed by LUZ MARIA Mathew CNP on 6/23/2022 at 9:46 AM

## 2022-09-15 ENCOUNTER — HOSPITAL ENCOUNTER (OUTPATIENT)
Dept: WOMENS IMAGING | Age: 47
Discharge: HOME | End: 2022-09-15
Payer: COMMERCIAL

## 2022-09-15 VITALS — BODY MASS INDEX: 36.37 KG/M2 | HEIGHT: 68 IN | WEIGHT: 240 LBS

## 2022-09-15 DIAGNOSIS — Z12.31 BREAST CANCER SCREENING BY MAMMOGRAM: ICD-10-CM

## 2022-09-15 PROCEDURE — 77063 BREAST TOMOSYNTHESIS BI: CPT

## 2022-09-20 ENCOUNTER — OFFICE VISIT (OUTPATIENT)
Dept: INTERNAL MEDICINE CLINIC | Age: 47
End: 2022-09-20
Payer: COMMERCIAL

## 2022-09-20 VITALS
OXYGEN SATURATION: 97 % | BODY MASS INDEX: 40.92 KG/M2 | SYSTOLIC BLOOD PRESSURE: 134 MMHG | HEART RATE: 76 BPM | TEMPERATURE: 97.6 F | DIASTOLIC BLOOD PRESSURE: 84 MMHG | HEIGHT: 68 IN | WEIGHT: 270 LBS

## 2022-09-20 DIAGNOSIS — E66.01 CLASS 3 SEVERE OBESITY DUE TO EXCESS CALORIES WITHOUT SERIOUS COMORBIDITY WITH BODY MASS INDEX (BMI) OF 40.0 TO 44.9 IN ADULT (HCC): ICD-10-CM

## 2022-09-20 DIAGNOSIS — F33.0 MILD EPISODE OF RECURRENT MAJOR DEPRESSIVE DISORDER (HCC): Primary | ICD-10-CM

## 2022-09-20 PROCEDURE — 99213 OFFICE O/P EST LOW 20 MIN: CPT | Performed by: NURSE PRACTITIONER

## 2022-09-20 RX ORDER — BUPROPION HYDROCHLORIDE 150 MG/1
150 TABLET, EXTENDED RELEASE ORAL 2 TIMES DAILY
Qty: 60 TABLET | Refills: 5 | Status: SHIPPED | OUTPATIENT
Start: 2022-09-20

## 2022-09-20 NOTE — PROGRESS NOTES
SUBJECTIVE:    Patient ID: Cynthia Perera is a 50 y.o. female. CC: Obesity, depression    HPI: Presents for follow-up of depression and obesity. She was previously given Wellbutrin and the dose was uptitrated for depression and obesity. She felt more irritability on higher dose of Wellbutrin. She would like to return to the previous lower dose. She continue to struggle with weight which is up from previous readings. Wt Readings from Last 3 Encounters:   09/20/22 270 lb (122.5 kg)   09/15/22 240 lb (108.9 kg)   06/23/22 263 lb (119.3 kg)           Past Medical History:   Diagnosis Date    Cancer ([facility] [address]     skin    Fibroid uterus     Obsessive compulsive disorder         Current Outpatient Medications   Medication Sig Dispense Refill    buPROPion (WELLBUTRIN XL) 300 MG extended release tablet Take 1 tablet by mouth every morning 90 tablet 3     No current facility-administered medications for this visit. Review of Systems   Constitutional:  Positive for unexpected weight change. Negative for chills and fever. Respiratory: Negative. Cardiovascular: Negative. Gastrointestinal: Negative. Genitourinary: Negative. Musculoskeletal: Negative. Neurological: Negative. Psychiatric/Behavioral:  Positive for dysphoric mood. OBJECTIVE:  Physical Exam  Vitals reviewed. Constitutional:       General: She is not in acute distress. Appearance: She is well-developed. She is not diaphoretic. HENT:      Head: Normocephalic and atraumatic. Eyes:      General: No scleral icterus. Conjunctiva/sclera: Conjunctivae normal.   Neck:      Vascular: No JVD. Cardiovascular:      Rate and Rhythm: Normal rate and regular rhythm. Pulmonary:      Effort: Pulmonary effort is normal. No respiratory distress. Breath sounds: Normal breath sounds. No wheezing. Abdominal:      General: There is no distension. Palpations: Abdomen is soft. Tenderness:  There is no abdominal tenderness. There is no guarding or rebound. Musculoskeletal:         General: Normal range of motion. Cervical back: Neck supple. Skin:     General: Skin is warm and dry. Neurological:      Mental Status: She is alert and oriented to person, place, and time. Psychiatric:         Behavior: Behavior normal.         Thought Content: Thought content normal.      /84   Pulse 76   Temp 97.6 °F (36.4 °C)   Ht 5' 8\" (1.727 m)   Wt 270 lb (122.5 kg)   LMP 08/20/2016   SpO2 97%   BMI 41.05 kg/m²      PHQ Scores 4/29/2022 3/29/2021 7/21/2020   PHQ2 Score 4 1 0   PHQ9 Score 13 1 0     Interpretation of Total Score Depression Severity: 1-4 = Minimal depression, 5-9 = Mild depression, 10-14 = Moderate depression, 15-19 = Moderately severe depression, 20-27 =Severe depression        ASSESSMENT/PLAN:  Bhavya was seen today for follow-up and weight loss. Diagnoses and all orders for this visit:    Mild episode of recurrent major depressive disorder ([facility] [address]  -     buPROPion (WELLBUTRIN SR) 150 MG extended release tablet; Take 1 tablet by mouth 2 times daily    Class 3 severe obesity due to excess calories without serious comorbidity with body mass index (BMI) of 40.0 to 44.9 in adult (HCC)  -     buPROPion (WELLBUTRIN SR) 150 MG extended release tablet; Take 1 tablet by mouth 2 times daily  -     [facility] (Bariatric Surgery), Rajeev Host    - See HPI  - Did not tolerate higher dose of wellbutrin, return to lower dose  - Weight worsening.   She will be referred to Weight management      LUZ MARIA Colvin - CNP

## 2022-09-25 PROBLEM — Z00.00 ANNUAL PHYSICAL EXAM: Status: ACTIVE | Noted: 2022-09-25

## 2022-09-25 ASSESSMENT — ENCOUNTER SYMPTOMS
GASTROINTESTINAL NEGATIVE: 1
RESPIRATORY NEGATIVE: 1

## 2022-10-14 DIAGNOSIS — G47.30 SLEEP APNEA, UNSPECIFIED TYPE: Primary | ICD-10-CM

## 2022-10-14 DIAGNOSIS — E66.01 CLASS 3 SEVERE OBESITY DUE TO EXCESS CALORIES WITHOUT SERIOUS COMORBIDITY WITH BODY MASS INDEX (BMI) OF 40.0 TO 44.9 IN ADULT (HCC): ICD-10-CM

## 2022-10-25 PROBLEM — Z00.00 ANNUAL PHYSICAL EXAM: Status: RESOLVED | Noted: 2022-09-25 | Resolved: 2022-10-25

## 2022-11-09 ENCOUNTER — OFFICE VISIT (OUTPATIENT)
Dept: BARIATRICS/WEIGHT MGMT | Age: 47
End: 2022-11-09
Payer: COMMERCIAL

## 2022-11-09 VITALS
BODY MASS INDEX: 42.69 KG/M2 | WEIGHT: 272 LBS | HEART RATE: 89 BPM | HEIGHT: 67 IN | DIASTOLIC BLOOD PRESSURE: 100 MMHG | SYSTOLIC BLOOD PRESSURE: 167 MMHG

## 2022-11-09 DIAGNOSIS — G47.33 OBSTRUCTIVE SLEEP APNEA, ADULT: ICD-10-CM

## 2022-11-09 DIAGNOSIS — E66.01 MORBID OBESITY WITH BMI OF 40.0-44.9, ADULT (HCC): Primary | ICD-10-CM

## 2022-11-09 DIAGNOSIS — I10 PRIMARY HYPERTENSION: ICD-10-CM

## 2022-11-09 PROCEDURE — 3074F SYST BP LT 130 MM HG: CPT | Performed by: SURGERY

## 2022-11-09 PROCEDURE — 3078F DIAST BP <80 MM HG: CPT | Performed by: SURGERY

## 2022-11-09 PROCEDURE — 99205 OFFICE O/P NEW HI 60 MIN: CPT | Performed by: SURGERY

## 2022-11-09 NOTE — PROGRESS NOTES
Danelle Barger is a 50 y.o. female with a date of birth of 1975. Vitals:    11/09/22 1108   BP: (!) 167/100   Pulse: 89    BMI: Body mass index is 42.6 kg/m². Obesity Classification: Class III    Weight History: Wt Readings from Last 3 Encounters:   11/09/22 272 lb (123.4 kg)   09/20/22 270 lb (122.5 kg)   09/15/22 240 lb (108.9 kg)     Patient's lowest adult weight was 121 lbs at age 20. Patient's highest adult weight was 270 lbs at age current. Weight cycling/yo-yoing through adulthood but pt states she can't lose the weight over the past 7 years. Patient has participated in the following weight loss programs: Weight Watcher [employer], LF diet, calorie restriction and low carb diet. Patient has not participated in meal replacement/liquid diets. Patient has participated in weight loss medications. - Adipex (lost 20 lb over about 2 months)    Patient is not lactose intolerant. Patient does not have Restorationism/cultural food concerns. Patient does not have food allergies. Patient does tolerate artificial sweeteners. 24 hour recall/food frequency chart: Sometimes eating pattern is inconsistent. Pt cooks occasionally. Snacking through dinner or not eating dinner at all. Breakfast: yes. Smartones breakfast  Snack: no.   Lunch: yes. Waggoner greek salad OR big mac, fish sandwich and medium umanzor   Snack: no.   Dinner: yes. Cilantro lime rice soup, 1 egg white bite, 1 glass white wine  Snack: yes. Carrot cake cupcakes OR fun size candy  Drinks throughout the day: unsweet tea, some diet coke  Do you drink alcohol? Yes. How often/how much alcohol do you drink: 3 Glasses/mixed drinks of wine per week. Patient may meet the criteria for binge eating disorder. Patient does have grazing. Patient does have night eating - some snacking while watching tv in the evening. Patient does have a history of emotional/stress eating or eating out of boredom.     Surgery  Patient does feel confident in her ability to make these changes. The patient's expectations of post-surgical eating habits seems realistic. Patient states she does understand the consequences of not complying with post-op food guidelines. Patient states she does understands the long term changes in food intake that will be necessary for all occasions after surgery for the rest of her life. Patient is deemed nutritionally appropriate to proceed. Goals  Weight: 150#  Health Improvement: Over health, feeling like herself again in her body, avoid health complications    Assessment  Nutritional Needs: RMR=(9.99 x 123.4) + (6.25 x 170.2) - (4.92 x 50 y.o.) -161 = 1895 kcal x 1.3 (sedentary activity factor)= 2464 kcal - 1000 (for 2 lb weight loss/week)= 1464 kcal.    Plan  Plan/Recommendations: Start presurgical guidelines. Pt interested in exploring non-surgical weight loss options. Goals:   -Eat 4-5 times daily  -Avoid high fat and high sugar foods  -Include protein with all meals and snacks  -Avoid carbonation and caffeine  -Avoid calorie containing beverages  -Increase physical activity as tolerated    PES Statement:  Overweight/Obesity related to lack of exercise, sedentary lifestyle, unhealthy eating habits, and unsuccessful diet attempts as evidenced by BMI. Body mass index is 42.6 kg/m². Will follow up as necessary.     Jorge Vasquez RD, LD

## 2022-11-14 NOTE — PROGRESS NOTES
[facility]     Outpatient Cardiology         Patient Name:  Lorena Flores  Requesting Physician: No admitting provider for patient encounter. Primary Care Physician: LUZ MARIA West CNP    Reason for Consultation/Chief Complaint:   Chief Complaint   Patient presents with    Pre-op Exam     Possible weight loss procedure         History of Present Illness:    HPI     Nadja Mendoza a 50 y.o. female with PMH of DEMETRIA, skin cancer, elevated d dimer, negative for PE. Here for CV risk assessment for bariatric surgery. Doing well from a cardiovascular perspective. No symptoms concerning for CAD or heart failure. Her EKG shows normal sinus rhythm without ischemic changes or prior infarction. She is fairly active and able to perform more than 4 METS on a regular basis. Her blood pressure is high today and we decided to start antihypertensives today.   Of note no coronary calcifications were seen on chest CT      PMH  Past Medical History:   Diagnosis Date    Cancer ([address]     skin    Fibroid uterus     Morbid obesity with BMI of 40.0-44.9, adult (HCC)     Obsessive compulsive disorder     Obstructive sleep apnea, adult     Pre-operative clearance     Sleep apnea        PSH  Past Surgical History:   Procedure Laterality Date    FRACTURE SURGERY      right ankle and toe    HYSTERECTOMY, TOTAL ABDOMINAL (CERVIX REMOVED)  09/09/2016    Robotic total hysterectomy, L salpingectomy & R salpingectomy-oopherectomy    SKIN CANCER EXCISION          Social HIstory  Social History     Tobacco Use    Smoking status: Never    Smokeless tobacco: Never   Vaping Use    Vaping Use: Never used   Substance Use Topics    Alcohol use: Yes     Comment: soc    Drug use: No       Family History  Family History   Problem Relation Age of Onset    Elevated Lipids Mother     Hypertension Mother     Diabetes Mother     High Blood Pressure Mother     Cancer Mother         breast    Breast Cancer Mother     Kidney Disease Father     Hypertension Father     Diabetes Father     Breast Cancer Maternal Cousin     Depression Paternal Grandfather     Diabetes Paternal Grandfather     Hypertension Paternal Grandfather     Diabetes Maternal Grandfather     Elevated Lipids Maternal Grandfather     Hypertension Maternal Grandfather     Coronary Art Dis Maternal Grandfather     Colon Cancer Paternal Grandmother        Allergies   Allergies   Allergen Reactions    Morphine Itching    Keflex [Cephalexin] Diarrhea and Nausea And Vomiting       Medications:     Home Medications:  Were reviewed and are listed in nursing record. and/or listed below    Prior to Admission medications    Medication Sig Start Date End Date Taking? Authorizing Provider   amLODIPine (NORVASC) 5 MG tablet Take 1 tablet by mouth daily 11/15/22  Yes Refugio Rivera MD   buPROPion [facility] SR) 150 MG extended release tablet Take 1 tablet by mouth 2 times daily 9/20/22  Yes LUZ MARIA Hairston - CNP        Review of Systems   Constitutional:  Negative for activity change, appetite change, diaphoresis, fatigue, fever and unexpected weight change. HENT:  Negative for congestion, facial swelling, mouth sores and nosebleeds. Eyes:  Negative for discharge and visual disturbance. Respiratory:  Negative for cough, chest tightness, shortness of breath and wheezing. Cardiovascular:  Negative for chest pain, palpitations and leg swelling. Gastrointestinal:  Negative for abdominal distention, abdominal pain, blood in stool and vomiting. Endocrine: Negative for cold intolerance, heat intolerance and polyuria. Genitourinary:  Negative for difficulty urinating, dysuria, frequency and hematuria. Musculoskeletal:  Negative for back pain, joint swelling, myalgias and neck pain. Skin:  Negative for color change, pallor and rash. Allergic/Immunologic: Negative for immunocompromised state.    Neurological:  Negative for dizziness, syncope, weakness, light-headedness, numbness and headaches. Hematological:  Negative for adenopathy. Does not bruise/bleed easily. Psychiatric/Behavioral:  Negative for behavioral problems, confusion, decreased concentration and suicidal ideas. The patient is not nervous/anxious. Vitals:    11/15/22 1347   BP: (!) 144/92   Pulse: 78    Weight: 267 lb 12.8 oz (121.5 kg)       Vitals:    11/15/22 1339 11/15/22 1347   BP: (!) 142/94 (!) 144/92   Site: Left Upper Arm Left Upper Arm   Position: Sitting Sitting   Cuff Size: Large Adult Large Adult   Pulse: 80 78   Weight: 267 lb 12.8 oz (121.5 kg)    Height: 5' 8\" (1.727 m)        BP Readings from Last 3 Encounters:   11/15/22 (!) 144/92   11/09/22 (!) 167/100   09/20/22 134/84       Wt Readings from Last 3 Encounters:   11/15/22 267 lb 12.8 oz (121.5 kg)   11/09/22 272 lb (123.4 kg)   09/20/22 270 lb (122.5 kg)       Physical Exam  Constitutional:       General: She is not in acute distress. Appearance: She is well-developed. She is not diaphoretic. HENT:      Head: Normocephalic and atraumatic. Eyes:      Pupils: Pupils are equal, round, and reactive to light. Neck:      Thyroid: No thyromegaly. Vascular: No JVD. Cardiovascular:      Rate and Rhythm: Normal rate and regular rhythm. Chest Wall: PMI is not displaced. Heart sounds: Normal heart sounds, S1 normal and S2 normal. No murmur heard. No friction rub. No gallop. Pulmonary:      Effort: Pulmonary effort is normal. No respiratory distress. Breath sounds: Normal breath sounds. No stridor. No wheezing or rales. Chest:      Chest wall: No tenderness. Abdominal:      General: Bowel sounds are normal. There is no distension. Palpations: Abdomen is soft. Tenderness: There is no abdominal tenderness. There is no guarding or rebound. Musculoskeletal:         General: No tenderness. Normal range of motion. Cervical back: Normal range of motion.    Lymphadenopathy: Cervical: No cervical adenopathy. Skin:     General: Skin is warm and dry. Findings: No erythema or rash. Neurological:      Mental Status: She is alert and oriented to person, place, and time. Coordination: Coordination normal.   Psychiatric:         Behavior: Behavior normal.         Thought Content:  Thought content normal.         Judgment: Judgment normal.       Labs:       Lab Results   Component Value Date    WBC 6.4 04/29/2022    HGB 14.0 04/29/2022    HCT 41.3 04/29/2022    MCV 90.6 04/29/2022     04/29/2022     Lab Results   Component Value Date     06/23/2022    K 4.2 06/23/2022     06/23/2022    CO2 26 06/23/2022    BUN 12 06/23/2022    CREATININE 0.9 06/23/2022    GLUCOSE 84 06/23/2022    CALCIUM 9.9 06/23/2022    PROT 6.7 04/29/2022    LABALBU 4.9 06/23/2022    BILITOT 0.3 04/29/2022    ALKPHOS 69 04/29/2022    AST 19 04/29/2022    ALT 38 04/29/2022    LABGLOM >60 06/23/2022    GFRAA >60 06/23/2022    AGRATIO 2.2 04/29/2022    GLOB 2.3 04/02/2021         Lab Results   Component Value Date    CHOL 172 04/29/2022    CHOL 165 04/02/2021    CHOL 169 05/31/2016     Lab Results   Component Value Date    TRIG 161 (H) 04/29/2022    TRIG 100 04/02/2021    TRIG 52 05/31/2016     Lab Results   Component Value Date    HDL 35 (L) 04/29/2022    HDL 53 04/02/2021    HDL 75 (H) 05/31/2016     Lab Results   Component Value Date    LDLCALC 105 (H) 04/29/2022    LDLCALC 92 04/02/2021    LDLCALC 84 05/31/2016     Lab Results   Component Value Date    LABVLDL 32 04/29/2022    LABVLDL 20 04/02/2021    LABVLDL 10 05/31/2016     No results found for: CHOLHDLRATIO    No results found for: INR, PROTIME    The 10-year ASCVD risk score (Jono DK, et al., 2019) is: 2.1%    Values used to calculate the score:      Age: 50 years      Sex: Female      Is Non- : No      Diabetic: No      Tobacco smoker: No      Systolic Blood Pressure: 381 mmHg      Is BP treated: No      HDL Cholesterol: 35 mg/dL      Total Cholesterol: 172 mg/dL      Imaging:       Last ECG (if available, Personally interpreted):  Normal sinus rhythm, no ischemic changes. Last Monitor/Holter (if available):    Last Stress (if available):    Last Cath (if available):    Last TTE/VEGA(if available):    Last CMR  (if available):    Last Coronary Artery Calcium Score: Ankle-brachial index:    Carotid ultrasound screening:    Abdominal aortic aneurysm screening:       Assessment / Plan:     HTN (hypertension)  Uncontrolled, start amlodipine 5. Continue to monitor at home. Preop cardiovascular exam  Okay to proceed with planned surgery without further testing. Overall low risk  Able to perform more than 4 METS on a regular basis. EKG without ischemic changes or prior infarction. I had the opportunity to review the clinical symptoms and presentation of Bhavya Fagan. Patient's allergies and medications were reviewed and updated. Patient's past medical, surgical, social and family history were reviewed and updated. Patient's testing including laboratory, ECGs, monitor, imaging (TTE,VEGA,CMR,cath) were reviewed. Tobacco use was discussed with the patient and educated on the negative effects. I have asked the patient to not utilize these agents. All questions and concerns were addressed to the patient/family. Alternatives to my treatment were discussed. The note was completed using EMR. Every effort wasmade to ensure accuracy; however, inadvertent computerized transcription errors may be present. Thank you for allowing me to participate in the[facility] or [address].  Tricia Harvey MD, [facility] [place], [facility] [address]

## 2022-11-15 ENCOUNTER — OFFICE VISIT (OUTPATIENT)
Dept: CARDIOLOGY CLINIC | Age: 47
End: 2022-11-15
Payer: COMMERCIAL

## 2022-11-15 VITALS
HEART RATE: 78 BPM | HEIGHT: 68 IN | DIASTOLIC BLOOD PRESSURE: 92 MMHG | SYSTOLIC BLOOD PRESSURE: 144 MMHG | WEIGHT: 267.8 LBS | BODY MASS INDEX: 40.59 KG/M2

## 2022-11-15 DIAGNOSIS — Z01.810 PREOP CARDIOVASCULAR EXAM: Primary | ICD-10-CM

## 2022-11-15 DIAGNOSIS — I10 PRIMARY HYPERTENSION: ICD-10-CM

## 2022-11-15 PROCEDURE — 3074F SYST BP LT 130 MM HG: CPT | Performed by: INTERNAL MEDICINE

## 2022-11-15 PROCEDURE — 3078F DIAST BP <80 MM HG: CPT | Performed by: INTERNAL MEDICINE

## 2022-11-15 PROCEDURE — 99203 OFFICE O/P NEW LOW 30 MIN: CPT | Performed by: INTERNAL MEDICINE

## 2022-11-15 PROCEDURE — 93000 ELECTROCARDIOGRAM COMPLETE: CPT | Performed by: INTERNAL MEDICINE

## 2022-11-15 RX ORDER — AMLODIPINE BESYLATE 5 MG/1
5 TABLET ORAL DAILY
Qty: 90 TABLET | Refills: 1 | Status: SHIPPED | OUTPATIENT
Start: 2022-11-15

## 2022-11-15 ASSESSMENT — ENCOUNTER SYMPTOMS
ABDOMINAL PAIN: 0
SHORTNESS OF BREATH: 0
COUGH: 0
EYE DISCHARGE: 0
BACK PAIN: 0
COLOR CHANGE: 0
CHEST TIGHTNESS: 0
ABDOMINAL DISTENTION: 0
WHEEZING: 0
FACIAL SWELLING: 0
VOMITING: 0
BLOOD IN STOOL: 0

## 2022-11-15 NOTE — ASSESSMENT & PLAN NOTE
Okay to proceed with planned surgery without further testing. Overall low risk  Able to perform more than 4 METS on a regular basis. EKG without ischemic changes or prior infarction.

## 2022-11-16 ASSESSMENT — ENCOUNTER SYMPTOMS
GASTROINTESTINAL NEGATIVE: 1
RESPIRATORY NEGATIVE: 1
COUGH: 0
EYES NEGATIVE: 1
SHORTNESS OF BREATH: 0
ALLERGIC/IMMUNOLOGIC NEGATIVE: 1

## 2022-11-16 NOTE — PROGRESS NOTES
[facility]) Physicians   [employer]    Subjective:      Patient ID: Nathalei Batista is a 50 y.o. female    HPI    The patient is here for their bariatric surgery presurgical visit for future weight loss. She has made several attempts at weight loss in the past without success and now wishes to pursue bariatric surgery. She is working to change her dietary behaviors and lose weight to improve comorbid conditions such as hypertension. Nathalie Batista is a 50 y.o. female with Body mass index is 42.29 kg/m². Past Medical History:   Diagnosis Date    Cancer ([address]     skin    Fibroid uterus     Morbid obesity with BMI of 40.0-44.9, adult ([facility])     Obsessive compulsive disorder     Obstructive sleep apnea, adult     Pre-operative clearance     Sleep apnea      Past Surgical History:   Procedure Laterality Date    FRACTURE SURGERY      right ankle and toe    HYSTERECTOMY, TOTAL ABDOMINAL (CERVIX REMOVED)  09/09/2016    Robotic total hysterectomy, L salpingectomy & R salpingectomy-oopherectomy    SKIN CANCER EXCISION       Family History   Problem Relation Age of Onset    Elevated Lipids Mother     Hypertension Mother     Diabetes Mother     High Blood Pressure Mother     Cancer Mother         breast    Breast Cancer Mother     Kidney Disease Father     Hypertension Father     Diabetes Father     Breast Cancer Maternal Cousin     Depression Paternal Grandfather     Diabetes Paternal Grandfather     Hypertension Paternal Grandfather     Diabetes Maternal Grandfather     Elevated Lipids Maternal Grandfather     Hypertension Maternal Grandfather     Coronary Art Dis Maternal Grandfather     Colon Cancer Paternal Grandmother      Social History     Tobacco Use    Smoking status: Never    Smokeless tobacco: Never   Substance Use Topics    Alcohol use: Not Currently     Comment: soc     I counseled the patient on the importance of not smoking and risks of ETOH.    Allergies   Allergen Reactions    Morphine Itching    Keflex [Cephalexin] Diarrhea and Nausea And Vomiting     Vitals:    12/05/22 1516   BP: 122/70   Pulse: 84   Resp: 18   SpO2: 98%   Weight: 270 lb (122.5 kg)   Height: 5' 7\" (1.702 m)     Body mass index is 42.29 kg/m².     Current Outpatient Medications:     amLODIPine (NORVASC) 5 MG tablet, Take 1 tablet by mouth daily, Disp: 90 tablet, Rfl: 1    buPROPion (WELLBUTRIN SR) 150 MG extended release tablet, Take 1 tablet by mouth 2 times daily, Disp: 60 tablet, Rfl: 5    Lab Results   Component Value Date/Time    WBC 6.4 04/29/2022 08:42 AM    RBC 4.56 04/29/2022 08:42 AM    HGB 14.0 04/29/2022 08:42 AM    HCT 41.3 04/29/2022 08:42 AM    MCV 90.6 04/29/2022 08:42 AM    MCH 30.7 04/29/2022 08:42 AM    MCHC 33.9 04/29/2022 08:42 AM    MPV 8.4 04/29/2022 08:42 AM    NEUTOPHILPCT 62.5 04/29/2022 08:42 AM    LYMPHOPCT 27.0 04/29/2022 08:42 AM    MONOPCT 6.3 04/29/2022 08:42 AM    EOSRELPCT 3.4 04/29/2022 08:42 AM    BASOPCT 0.8 04/29/2022 08:42 AM    NEUTROABS 4.0 04/29/2022 08:42 AM    LYMPHSABS 1.7 04/29/2022 08:42 AM    MONOSABS 0.4 04/29/2022 08:42 AM    EOSABS 0.2 04/29/2022 08:42 AM     Lab Results   Component Value Date/Time     06/23/2022 02:26 PM    K 4.2 06/23/2022 02:26 PM     06/23/2022 02:26 PM    CO2 26 06/23/2022 02:26 PM    ANIONGAP 11 06/23/2022 02:26 PM    GLUCOSE 84 06/23/2022 02:26 PM    BUN 12 06/23/2022 02:26 PM    CREATININE 0.9 06/23/2022 02:26 PM    LABGLOM >60 06/23/2022 02:26 PM    GFRAA >60 06/23/2022 02:26 PM    CALCIUM 9.9 06/23/2022 02:26 PM    PROT 6.7 04/29/2022 08:42 AM    LABALBU 4.9 06/23/2022 02:26 PM    AGRATIO 2.2 04/29/2022 08:42 AM    BILITOT 0.3 04/29/2022 08:42 AM    ALKPHOS 69 04/29/2022 08:42 AM    ALT 38 04/29/2022 08:42 AM    AST 19 04/29/2022 08:42 AM    GLOB 2.3 04/02/2021 11:52 AM     Lab Results   Component Value Date/Time    CHOL 172 04/29/2022 08:42 AM    TRIG 161 04/29/2022 08:42 AM    HDL 35 04/29/2022 08:42 AM    LDLCALC 105 04/29/2022 08:42 AM LABVLDL 32 04/29/2022 08:42 AM     Lab Results   Component Value Date/Time    TSHREFLEX 3.28 04/29/2022 08:42 AM     No results found for: IRON, TIBC, LABIRON  No results found for: [id number], FOLATE  Lab Results   Component Value Date/Time    VITD25 47.6 05/31/2016 12:00 PM     Lab Results   Component Value Date/Time    LABA1C 5.2 04/29/2022 08:42 AM    .5 04/29/2022 08:42 AM       Review of Systems   Constitutional: Negative. Negative for chills, fatigue and fever. HENT: Negative. Eyes: Negative. Respiratory: Negative. Negative for cough and shortness of breath. Cardiovascular: Negative. Gastrointestinal: Negative. Endocrine: Negative. Genitourinary: Negative. Musculoskeletal: Negative. Skin: Negative. Allergic/Immunologic: Negative. Neurological: Negative. Hematological: Negative. Psychiatric/Behavioral: Negative. Objective:   Physical Exam  Vitals reviewed. Constitutional:       Appearance: Normal appearance. She is well-developed. She is obese. HENT:      Head: Normocephalic and atraumatic. Eyes:      Conjunctiva/sclera: Conjunctivae normal.      Pupils: Pupils are equal, round, and reactive to light. Cardiovascular:      Rate and Rhythm: Normal rate. Pulmonary:      Effort: Pulmonary effort is normal.   Abdominal:      Palpations: Abdomen is soft. Musculoskeletal:         General: Normal range of motion. Cervical back: Normal range of motion and neck supple. Skin:     General: Skin is warm and dry. Neurological:      Mental Status: She is alert and oriented to person, place, and time. Psychiatric:         Mood and Affect: Mood normal.         Behavior: Behavior normal.         Thought Content: Thought content normal.         Judgment: Judgment normal.     Assessment and Plan:   Patient is here for their 2nd presurgery visit for sleeve, down 2 lbs. The patient's current Body mass index is 42.29 kg/m². (12/5/22).  She is making some dietary and behavior modifications. Needs to make sure she is getting protein with all her meals/snacks. She did meet with the registered dietitian for continued follow up. Discussed with dietitian and I agree with recommendations and plan. She is not exercising since the time change, but does have a [employer]. Encouraged physical activity as able. Patient with h/o hysterectomy so did not need to receive instruction that it is recommended to avoid pregnancy following bariatric surgery for at least 2 years to allow them to have stable weight loss and to help avoid increased risk of vitamin deficiencies and malnutrition. Patient counseled on the avoidance of tobacco/nicotine, alcohol and illicit drug abuse. Discussed preop work up which still needs labs (ordered additional labs not ordered by Dr. Devendra Mendoza), EGD (Schedule next visit as patient still on fence about surgery), psych evaluation (scheduled 2/7/2023), sleep clearance (scheduled 1/11/2023), support group, PCP letter and protein sample. We will see her back in 1 month for continued follow up. Will have patient see Dr. Jorge L Fowler so she can meet him as patient transferred from Dr. Devendra Mendoza. Essential Hypertension:  [x] Continue to make dietary and lifestyle modifications per our recommendations. [x] Reviewed the importance of checking blood pressure. [x] Continue to follow up with their PCP for medication management (Norvasc) and monitoring. [] Follow up labs ordered today. Obesity:  [x] Continue to make dietary and lifestyle modifications per our recommendations. [x] Plan for Future laparoscopic sleeve gastrectomy. [x] Return for follow-up next month.     Total encounter time: 30 minutes, including any number of the following: Bariatric Preoperative work up/protocols, review of labs, imaging, provider notes, outside hospital records, performing examination/evaluation, counseling patient and/or family, ordering medications/tests, placing referrals and communication with referring physicians, coordination of care; discussing exercise and physical activity; discussing dietary plan/recall with the patient as well with registered dietitian and documentation in the EHR. Of note, the above was done during same day of the actual patient encounter.

## 2022-11-16 NOTE — PATIENT INSTRUCTIONS
Goals in preparing for bariatric surgery  You should be giving up all beverages that have carbonation, sugar, and caffeine (Refer to the approved liquids list provided at initial visit). You should be drinking 64 ounces of low calorie (5 calories or less per serving) fluids per day. Suggestions include:  Water (you may add fresh lemon or lime)  Crystal Light  Cowarts Liquid Water Enhancer  Propel Zero  Powerade Zero/Gatorade Zero  Isopure  [id number]  SOBE Lifewater Zero  Vitamin Water Zero  Sugar Free Gary-Aid  You should be eating 4-6 times per day. Three small meals plus 1-2 snacks per day is your goal. This balances your calories and nutrients evenly throughout the day and helps to boost your metabolism. Refer to the snack list provided at your initial visit. Aim for a protein at every snack, plus a fruit, vegetable or starch. You should be eating protein at every meal and snack. Protein is typically found in animal sources, i.e. chicken, lean beef, lean pork, fish, seafood and eggs. It is also found in low-fat dairy sources such as skim or 1% milk, low-fat yogurt, low-fat cheese, and low-fat cottage cheese. Plant based sources of protein include peanut butter, beans, and soy. You should be utilizing the 9-inch plate method. Eating on a smaller plate will help you control portion size, but what you put on your plate counts also. Make ¼ of your plate lean protein, ¼ carbohydrate (fruit, grain or starchy vegetables) and ½ the plate non-starchy vegetables. You should eliminate caffeine. Caffeine is dehydrating. After surgery, it's very important to stay hydrated. Giving up caffeine before surgery will help you focus on the changes necessary to be successful after surgery. There are many decaffeinated coffee and tea products available in grocery stores. You should eliminate alcohol. You must abstain from alcohol prior to surgery and for at least the first 6-9 months after surgery.  Although you may have alcohol in the future it is important to understand, as a bariatric surgery patient, there is a risk of it negatively affecting your weight loss goals and it can pose a risk for addiction. You should be reducing added fat and sugar in your diet. Frying foods adds too much fat and calories, but you could use an air fryer as it requires significantly less oil. Baking, broiling, or grilling meats add flavor without unhealthy fats. Using cooking oil spray and spray butter products are also healthy options that will aid in your weight loss. Foods high in added sugars are often also high in calories and low in nutrients. If you are a smoker or use e-cigarettes/vaping, you must eliminate. You must be nicotine free and/or not vaping for at least 8 weeks before your surgery can be scheduled. You will be screened for nicotine through lab work. Eating habits after surgery need to be a long-term change. Eating habits are often so ingrained that it can be difficult to change. It is important to practice new eating habits prior to surgery to mentally prepare yourself for the challenge ahead. Also, remember that overall health, age, and genetics make each person's weight loss progress different. Do not compare your progress (pre- or post-operatively), the amount you eat, or your exercise to other patients. In addition, it is the responsibility of the patient to schedule and follow up on labs and tests completed during the pre-surgical period. Results will be reviewed at each visit. **IT IS IMPORTANT TO KNOW THAT YOU MUST COMPLETE ALL REQUIRED DIETARY CHANGES, TESTS, CLEARANCES AND ACTIVITIES BEFORE A SURGERY DATE CAN BE GIVEN. IF YOU HAVE NOT MET ANY OF THESE REQUIREMENTS THEN YOU WILL NOT BE GIVEN A SURGERY DATE UNTIL THEY HAVE BEEN MET TO OUR SATISFACTION. THIS IS NOT ONLY DONE TO HELP YOU BE SUCCESSFUL AFTER SURGERY, BUT TO BE SAFE AS WELL.**    Patient received dietary handouts and education. Plan/Recommendations:   - Add protein with snacks  - Try Protein Powder  - Increase activity to 30 minutes 2-3X/week

## 2022-11-26 NOTE — PROGRESS NOTES
[facility]) Physicians   General & Laparoscopic Surgery  [employer]      HPI:    Gely Wall is a very pleasant 50 y.o. obese female ,   Body mass index is 42.6 kg/m². And multiple medical problems who is presenting for weight loss surgery evaluation and consultation by Livier Miller. Patient has been struggling for several years now with obesity. Patient feels the weight is an obstacle to achieve and perform things in daily living as well risk on health. Tries to diet, and exercise but can't keep the weight off. Patient tried other regimens, but with no sustainable weight loss. Patient  is very determined to lose weight and be healthy, and is interested in surgical weight loss to achieve this goal.    Otherwise patient denies any nausea, vomiting, fevers, chills, shortness of breath, chest pain, constipation or urinary symptoms.       Morbid obesity and co-morbidities related to it are a threat to bodily function    Past Medical History:   Diagnosis Date    Cancer ([address]     skin    Fibroid uterus     Morbid obesity with BMI of 40.0-44.9, adult ([facility])     Obsessive compulsive disorder     Obstructive sleep apnea, adult     Pre-operative clearance     Sleep apnea      Past Surgical History:   Procedure Laterality Date    FRACTURE SURGERY      right ankle and toe    HYSTERECTOMY, TOTAL ABDOMINAL (CERVIX REMOVED)  09/09/2016    Robotic total hysterectomy, L salpingectomy & R salpingectomy-oopherectomy    SKIN CANCER EXCISION       Family History   Problem Relation Age of Onset    Elevated Lipids Mother     Hypertension Mother     Diabetes Mother     High Blood Pressure Mother     Cancer Mother         breast    Breast Cancer Mother     Kidney Disease Father     Hypertension Father     Diabetes Father     Breast Cancer Maternal Cousin     Depression Paternal Grandfather     Diabetes Paternal Grandfather     Hypertension Paternal Grandfather     Diabetes Maternal Grandfather     Elevated Lipids Maternal Grandfather     Hypertension Maternal Grandfather     Coronary Art Dis Maternal Grandfather     Colon Cancer Paternal Grandmother      Social History     Tobacco Use    Smoking status: Never    Smokeless tobacco: Never   Substance Use Topics    Alcohol use: Yes     Comment: soc     I counseled the patient on the importance of not smoking and risks of ETOH. Allergies   Allergen Reactions    Morphine Itching    Keflex [Cephalexin] Diarrhea and Nausea And Vomiting     Vitals:    11/09/22 1108   BP: (!) 167/100   Pulse: 89   Weight: 272 lb (123.4 kg)   Height: 5' 7\" (1.702 m)       Body mass index is 42.6 kg/m². Current Outpatient Medications:     buPROPion (WELLBUTRIN SR) 150 MG extended release tablet, Take 1 tablet by mouth 2 times daily, Disp: 60 tablet, Rfl: 5    amLODIPine (NORVASC) 5 MG tablet, Take 1 tablet by mouth daily, Disp: 90 tablet, Rfl: 1      Review of Systems - History obtained from the patient  General ROS: negative  Psychological ROS: negative  Ophthalmic ROS: negative  Neurological ROS: negative  ENT ROS: negative  Allergy and Immunology ROS: negative  Hematological and Lymphatic ROS: negative  Endocrine ROS: negative  Respiratory ROS: negative  Cardiovascular ROS: negative  Gastrointestinal ROS:negative  Genito-Urinary ROS: negative  Musculoskeletal ROS: negative   Skin ROS: negative    Physical Exam   Constitutional: Patient is oriented to person, place, and time. Vital signs are normal. Patient  appears well-developed and well-nourished. Patient  is active and cooperative. Non-toxic appearance. No distress. HENT:   Head: Normocephalic and atraumatic. Head is without laceration. Right Ear: External ear normal. No lacerations. No drainage, swelling or tenderness. Left Ear: External ear normal. No lacerations. No drainage, swelling or tenderness. Nose: Nose normal. No nose lacerations or nasal deformity.    Mouth/Throat: Uvula is midline, oropharynx is clear and moist and mucous membranes are normal. No oropharyngeal exudate. Eyes: Conjunctivae, EOM and lids are normal. Pupils are equal, round, and reactive to light. Right eye exhibits no discharge. No foreign body present in the right eye. Left eye exhibits no discharge. No foreign body present in the left eye. No scleral icterus. Neck: Trachea normal and normal range of motion. Neck supple. No JVD present. No tracheal tenderness present. Carotid bruit is not present. No rigidity. No tracheal deviation and no edema present. No thyromegaly present. Cardiovascular: Normal rate, regular rhythm, normal heart sounds, intact distal pulses and normal pulses. Pulmonary/Chest: Effort normal and breath sounds normal. No stridor. No respiratory distress. Patient  has no wheezes. Patient has no rales. Patient exhibits no tenderness and no crepitus. Abdominal: Soft. Normal appearance and bowel sounds are normal. Patient exhibits no distension, no abdominal bruit, no ascites and no mass. There is no hepatosplenomegaly. There is no tenderness. There is no rigidity, no rebound, no guarding and no CVA tenderness. No hernia. Hernia confirmed negative in the ventral area. Musculoskeletal: Normal range of motion. Patient exhibits no edema or tenderness. Lymphadenopathy:        Head (right side): No submental, no submandibular, no preauricular, no posterior auricular and no occipital adenopathy present. Head (left side): No submental, no submandibular, no preauricular, no posterior auricular and no occipital adenopathy present. Patient  has no cervical adenopathy. Right: No supraclavicular adenopathy present. Left: No supraclavicular adenopathy present. Neurological: Patient is alert and oriented to person, place, and time. Patient has normal strength. Coordination and gait normal. GCS eye subscore is 4. GCS verbal subscore is 5. GCS motor subscore is 6. Skin: Skin is warm and dry.  No abrasion and no rash noted. Patient  is not diaphoretic. No cyanosis or erythema. Psychiatric: Patient has a normal mood and affect. speech is normal and behavior is normal. Cognition and memory are normal.             A/P  Prasad Regalado is a very pleasant 50 y.o. female with Obesity,  Body mass index is 42.6 kg/m². and multiple obesity related co-morbidities. Prasad Regalado is very motivated to lose weight and being more healthy. We discussed how her weight affects her overall health including:  Bhavya was seen today for bariatric, initial visit. Diagnoses and all orders for this visit:    Morbid obesity with BMI of 40.0-44.9, adult ([facility] [address]  -     CBC with Auto Differential; Future  -     Comprehensive Metabolic Panel; Future  -     Hemoglobin A1C; Future  -     Iron and TIBC; Future  -     Lipid Panel; Future  -     TSH with Reflex; Future  -     Vitamin B12 & Folate; Future  -     Vitamin D 25 Hydroxy; Future  -     Wiliam Sesay MD, Cardiology, [facility]    Obstructive sleep apnea, adult  -     CBC with Auto Differential; Future  -     Comprehensive Metabolic Panel; Future  -     Hemoglobin A1C; Future  -     Iron and TIBC; Future  -     Lipid Panel; Future  -     TSH with Reflex; Future  -     Vitamin B12 & Folate; Future  -     Vitamin D 25 Hydroxy; Future  -     Wiliam Sesay MD, Cardiology, [facility]    Primary hypertension      The patient underwent 30 minutes of dietary counseling. I have reviewed, discussed and agree with the dietary plan. Medical weight loss and different surgical options were discussed in details with patient. Prasad Regalado is interested in surgical weight loss. Patient is interested in Laparoscopic Sleeve Gastrectomy, which I believe is an excellent option for the patient. We will proceed with pre-operative work up labs and studies. Will also petition patient's  insurance for approval for this procedure. Patient received dietary handouts and education.    Patient advised that its their responsibility to follow up for studies and/or labs ordered today. Also discussed in details the importance of follow up, as well following the recommendations and completing the whole program to improve outcomes when it comes to healthier lifestyle as well weight loss. Patient also advised about risks and benefits being on a strict dietary regimen as well using supplements. Patient agrees and wants to proceed with weight loss planning     Labs ordered. Cardiac Clearance. Sleep Clearance. Psych Evaluation. H-pylori   EGD  Support Group. PCP Letter. Weight History    F/U in 4 weeks. Patient advised that its their responsibility to follow up for studies and/or labs ordered today.

## 2022-12-05 ENCOUNTER — OFFICE VISIT (OUTPATIENT)
Dept: BARIATRICS/WEIGHT MGMT | Age: 47
End: 2022-12-05
Payer: COMMERCIAL

## 2022-12-05 VITALS
HEIGHT: 67 IN | OXYGEN SATURATION: 98 % | DIASTOLIC BLOOD PRESSURE: 70 MMHG | HEART RATE: 84 BPM | SYSTOLIC BLOOD PRESSURE: 122 MMHG | RESPIRATION RATE: 18 BRPM | WEIGHT: 270 LBS | BODY MASS INDEX: 42.38 KG/M2

## 2022-12-05 DIAGNOSIS — Z01.818 PREOP TESTING: ICD-10-CM

## 2022-12-05 DIAGNOSIS — G47.33 OBSTRUCTIVE SLEEP APNEA, ADULT: ICD-10-CM

## 2022-12-05 DIAGNOSIS — E66.01 MORBID OBESITY WITH BMI OF 40.0-44.9, ADULT (HCC): ICD-10-CM

## 2022-12-05 DIAGNOSIS — I10 PRIMARY HYPERTENSION: Primary | ICD-10-CM

## 2022-12-05 PROCEDURE — 99214 OFFICE O/P EST MOD 30 MIN: CPT | Performed by: NURSE PRACTITIONER

## 2022-12-05 PROCEDURE — 3074F SYST BP LT 130 MM HG: CPT | Performed by: NURSE PRACTITIONER

## 2022-12-05 PROCEDURE — 3078F DIAST BP <80 MM HG: CPT | Performed by: NURSE PRACTITIONER

## 2022-12-05 NOTE — PROGRESS NOTES
Bhavya Luna lost 2 lbs over 1 month. Pt reports doing well at work and track w/ spreadsheet, struggles with getting busy and forgetting to eat home. Wake up 6 AM, works as  8 AM  Breakfast: 9:15 AM: fruit + egg bite   Snack: 11 AM: Handful grapes   Lunch: 1 PM: None at home OR 1 cup chix noodle soup + 1/2 turkey club   Snack: Apple   Dinner: 7 PM: Leftovers OR Soup/chili w/ cheese, avoiding chips    Snack: Has been avoiding OR sf fudge pop  Bed around 12:30-1 AM, does not feel rested     Is pt consuming smaller portions? yes reading labels, taking less    Is pt consuming at least 64 oz of fluids per day? yes 60 oz water/ sf flavors/ unsweet tea w/ sweetener     Is pt consuming carbonated, caffeinated, or sugary beverages? yes diet coke 24 oz/day  + 2 glasses wine or beer /weekend    Has pt sampled Unjury and/or Nectar protein? Encouraged pt to try grab'n'go's before next appointment. Has patient attended a support group?  Not scheduled     Exercise: inconsistent, was walking 3 miles 3-4X/week but can't with time change, has [employer] membership     Plan/Recommendations:   - Add protein with snacks  - Try Protein Powder  - Increase activity to 30 minutes 2-3X/week     Handouts: none     Tali Hwang RD, LD

## 2022-12-15 PROBLEM — Z01.810 PREOP CARDIOVASCULAR EXAM: Status: RESOLVED | Noted: 2022-11-15 | Resolved: 2022-12-15

## 2023-01-11 ENCOUNTER — OFFICE VISIT (OUTPATIENT)
Dept: PULMONOLOGY | Age: 48
End: 2023-01-11
Payer: COMMERCIAL

## 2023-01-11 VITALS
BODY MASS INDEX: 41.12 KG/M2 | DIASTOLIC BLOOD PRESSURE: 81 MMHG | HEIGHT: 67 IN | OXYGEN SATURATION: 97 % | HEART RATE: 74 BPM | SYSTOLIC BLOOD PRESSURE: 132 MMHG | WEIGHT: 262 LBS

## 2023-01-11 DIAGNOSIS — R06.83 SNORING: ICD-10-CM

## 2023-01-11 DIAGNOSIS — I10 PRIMARY HYPERTENSION: Chronic | ICD-10-CM

## 2023-01-11 DIAGNOSIS — E66.01 MORBID OBESITY WITH BMI OF 40.0-44.9, ADULT (HCC): Chronic | ICD-10-CM

## 2023-01-11 DIAGNOSIS — F33.0 MILD EPISODE OF RECURRENT MAJOR DEPRESSIVE DISORDER (HCC): Chronic | ICD-10-CM

## 2023-01-11 DIAGNOSIS — G47.10 HYPERSOMNIA: Primary | ICD-10-CM

## 2023-01-11 PROCEDURE — 99204 OFFICE O/P NEW MOD 45 MIN: CPT | Performed by: INTERNAL MEDICINE

## 2023-01-11 PROCEDURE — 3079F DIAST BP 80-89 MM HG: CPT | Performed by: INTERNAL MEDICINE

## 2023-01-11 PROCEDURE — 3075F SYST BP GE 130 - 139MM HG: CPT | Performed by: INTERNAL MEDICINE

## 2023-01-11 ASSESSMENT — SLEEP AND FATIGUE QUESTIONNAIRES
HOW LIKELY ARE YOU TO NOD OFF OR FALL ASLEEP WHILE SITTING QUIETLY AFTER LUNCH WITHOUT ALCOHOL: 0
HOW LIKELY ARE YOU TO NOD OFF OR FALL ASLEEP WHILE WATCHING TV: 3
HOW LIKELY ARE YOU TO NOD OFF OR FALL ASLEEP WHILE SITTING AND READING: 2
HOW LIKELY ARE YOU TO NOD OFF OR FALL ASLEEP WHILE SITTING AND TALKING TO SOMEONE: 0
HOW LIKELY ARE YOU TO NOD OFF OR FALL ASLEEP WHILE SITTING INACTIVE IN A PUBLIC PLACE: 0
HOW LIKELY ARE YOU TO NOD OFF OR FALL ASLEEP IN A CAR, WHILE STOPPED FOR A FEW MINUTES IN TRAFFIC: 0
HOW LIKELY ARE YOU TO NOD OFF OR FALL ASLEEP WHEN YOU ARE A PASSENGER IN A CAR FOR AN HOUR WITHOUT A BREAK: 2
HOW LIKELY ARE YOU TO NOD OFF OR FALL ASLEEP WHILE LYING DOWN TO REST IN THE AFTERNOON WHEN CIRCUMSTANCES PERMIT: 3
ESS TOTAL SCORE: 10
NECK CIRCUMFERENCE (INCHES): 16.5

## 2023-01-11 NOTE — PROGRESS NOTES
Jimmy Horan CNP [address]  [address] [facility]  [address], [address] [facility]- [phone]   [facility]. [address] [place] [address]. Tani Dejesus [address] [phone]     [place] [address]  Dept: [phone]  Loc: [phone]    Assessment:      Visit Diagnoses and Associated Orders       Hypersomnia   (New Problem)  -  Primary    needs work-up    Home Sleep Study (HST) [32512 Custom]   - Future Order         Snoring   (New Problem)      needs work-up    Home Sleep Study (HST) [63583 Custom]   - Future Order         Primary hypertension   (Stable)           Mild episode of recurrent major depressive disorder (HCC)   (Stable)           Morbid obesity with BMI of 40.0-44.9, adult (HCC)   (Not Stable)                    Plan:      One or more undiagnosed new problem with uncertain prognosis till final diagnosis is made. Differential diagnosis includes but not limited to: DEMETRIA, PLMD's, narcolepsy, parasomnias. Reviewed DEMETRIA (highest likelihood Dx): pathophysiology, diagnosis, complications and treatment. Instructed her not to drive if drowsy. Continue medications per her PCP and other physicians. Limit caffeine use after 3pm. Standard of care is to do in-lab PSG but insurance is mandating an inferior HST. 1 wk follow up after study to review her results. The chronic medical conditions listed are directly related to the primary diagnosis listed above. The management of the primary diagnosis affects the secondary diagnosis and vice versa. Reviewed her phone of the patient's primary care doctor dated 10/14/2022 showing the patient risk for possible sleep apnea.     Hemoglobin A1c, TSH, CBC from 4/29/2022 were essentially normal.  Renal function from 6/23/2022 was normal.    This information was analyzed to assess complexity and medical decision making in regards to further testing and management. Continue meds for: hypertension and depression. Pt would medically benefit from wt loss for DEMETRIA (diet, exercise, surgical). Orders Placed This Encounter   Procedures    Home Sleep Study (HST)            Subjective:     Patient ID: Steph Vergara is a 50 y.o. female. Chief Complaint   Patient presents with    Snoring       HPI:      Steph Vergara is a 50 y.o. female referred by LUZ MARIA Quinonez CNP for a sleep evaluation. She complains of: snoring, witnessed apneas, excessive daytime sleepiness , non-restorative sleep, nocturia, snorting awake, short term memory issues, and tossing and turning at night. She denies: cataplexy and hypnagogic hallucinations. Symptoms began several years ago, gradually worsening since that time. DOT/CDL - no  FAA/'s license -no    Previous Report(s) Reviewed: historical medical records, office notes, andreferral letter(s). Pertinent data has been documented.     [place] Sleepiness Score: 10    Caffeine Intake - Ice tea: 2 glass(es) per day    Social History     Socioeconomic History    Marital status:      Spouse name: Not on file    Number of children: Not on file    Years of education: Not on file    Highest education level: Not on file   Occupational History    Not on file   Tobacco Use    Smoking status: Never    Smokeless tobacco: Never   Vaping Use    Vaping Use: Never used   Substance and Sexual Activity    Alcohol use: Yes     Comment: soc    Drug use: No    Sexual activity: Yes     Partners: Male   Other Topics Concern    Not on file   Social History Narrative    Not on file     Social Determinants of Health     Financial Resource Strain: Low Risk     Difficulty of Paying Living Expenses: Not hard at all   Food Insecurity: No Food Insecurity    Worried About Running Out of Food in the Last Year: Never true    Ran Out of Food in the Last Year: Never true   Transportation Needs: Not on file   Physical Activity: Not on file Stress: Not on file   Social Connections: Not on file   Intimate Partner Violence: Not on file   Housing Stability: Not on file        Current Outpatient Medications   Medication Instructions    amLODIPine (NORVASC) 5 mg, Oral, DAILY    buPROPion (WELLBUTRIN SR) 150 mg, Oral, 2 TIMES DAILY          Objective:     Vitals:  Weight BMI   Wt Readings from Last 3 Encounters:   01/11/23 262 lb (118.8 kg)   12/05/22 270 lb (122.5 kg)   11/15/22 267 lb 12.8 oz (121.5 kg)    Body mass index is 41.04 kg/m². BP HR SaO2   BP Readings from Last 3 Encounters:   01/11/23 132/81   12/05/22 122/70   11/15/22 (!) 144/92    Pulse Readings from Last 3 Encounters:   01/11/23 74   12/05/22 84   11/15/22 78    SpO2 Readings from Last 3 Encounters:   01/11/23 97%   12/05/22 98%   09/20/22 97%        Physical Exam  Vitals reviewed. Constitutional:       General: She is not in acute distress. Appearance: Normal appearance. She is well-developed. She is obese. She is not toxic-appearing or diaphoretic. HENT:      Head: Normocephalic and atraumatic. Not macrocephalic and not microcephalic. Nose: Septal deviation and mucosal edema present. No nasal deformity. Mouth/Throat:      Lips: Pink. Mouth: Mucous membranes are moist.      Tongue: No lesions. Palate: No mass. Pharynx: Uvula midline. Uvula swelling present. No oropharyngeal exudate. Tonsils: No tonsillar exudate or tonsillar abscesses. Comments: Tonsils: normal size  Eyes:      General: Lids are normal.      Extraocular Movements: Extraocular movements intact. Pupils: Pupils are equal, round, and reactive to light. Neck:      Vascular: No JVD. Comments: Neck Circ: 16.5 inches    Cardiovascular:      Rate and Rhythm: Normal rate and regular rhythm. Heart sounds: Normal heart sounds. Pulmonary:      Effort: Pulmonary effort is normal.      Breath sounds: Normal breath sounds.    Musculoskeletal:      Cervical back: Normal range of motion. Comments: No evidence of cyanosis or clubbing of nails   Skin:     General: Skin is warm. Nails: There is no clubbing. Neurological:      Mental Status: She is alert. Psychiatric:         Attention and Perception: Attention normal.         Mood and Affect: Affect normal.         Speech: Speech normal.         Behavior: Behavior is cooperative.        Electronically signed by Shamika Wetzel MD on1/11/2023 at 11:21 AM

## 2023-01-11 NOTE — LETTER
[facility] Sleep Medicine  [place] [address] [facility] [address]  Phone: [phone]  Fax: [phone]           Barbara Ayala MD      January 11, 2023     Patient: Ivan Sparks   MR Number: [id number]   YOB: 1975   Date of Visit: 1/11/2023       Dear Dr. Lidia Mac: Thank you for referring Bhavya Pili  to me for evaluation/treatment. Below are the relevant portions of my assessment and plan of care. Visit Diagnoses and Associated Orders       Hypersomnia   (New Problem)  -  Primary    needs work-up    Home Sleep Study (HST) [26835 Custom]   - Future Order         Snoring   (New Problem)      needs work-up    Home Sleep Study (HST) [46750 Custom]   - Future Order         Primary hypertension   (Stable)           Mild episode of recurrent major depressive disorder (HCC)   (Stable)           Morbid obesity with BMI of 40.0-44.9, adult (HCC)   (Not Stable)                   One or more undiagnosed new problem with uncertain prognosis till final diagnosis is made. Differential diagnosis includes but not limited to: DEMETRIA, PLMD's, narcolepsy, parasomnias. Reviewed DEMETRIA (highest likelihood Dx): pathophysiology, diagnosis, complications and treatment. Instructed her not to drive if drowsy. Continue medications per her PCP and other physicians. Limit caffeine use after 3pm. Standard of care is to do in-lab PSG but insurance is mandating an inferior HST. 1 wk follow up after study to review her results. The chronic medical conditions listed are directly related to the primary diagnosis listed above. The management of the primary diagnosis affects the secondary diagnosis and vice versa. Reviewed her phone of the patient's primary care doctor dated 10/14/2022 showing the patient risk for possible sleep apnea.     Hemoglobin A1c, TSH, CBC from 4/29/2022 were essentially normal.  Renal function from 6/23/2022 was normal.    This information was analyzed to assess complexity and medical decision making in regards to further testing and management. Continue meds for: hypertension and depression. Pt would medically benefit from wt loss for DEMETRIA (diet, exercise, surgical). Orders Placed This Encounter   Procedures    Home Sleep Study (HST)         If you have questions, please do not hesitate to call me. I look forward to following Bhavya along with you.     Sincerely,        Rochelle Diaz MD    CC providers:  Whitney George, APRN - CNP  [address] [address]  [address] [employer]

## 2023-01-31 ENCOUNTER — OFFICE VISIT (OUTPATIENT)
Dept: BARIATRICS/WEIGHT MGMT | Age: 48
End: 2023-01-31
Payer: COMMERCIAL

## 2023-01-31 VITALS
BODY MASS INDEX: 41.59 KG/M2 | HEART RATE: 81 BPM | HEIGHT: 67 IN | DIASTOLIC BLOOD PRESSURE: 78 MMHG | SYSTOLIC BLOOD PRESSURE: 128 MMHG | RESPIRATION RATE: 18 BRPM | OXYGEN SATURATION: 97 % | WEIGHT: 265 LBS

## 2023-01-31 DIAGNOSIS — G47.33 OBSTRUCTIVE SLEEP APNEA, ADULT: ICD-10-CM

## 2023-01-31 DIAGNOSIS — E66.01 MORBID OBESITY WITH BMI OF 40.0-44.9, ADULT (HCC): Primary | Chronic | ICD-10-CM

## 2023-01-31 DIAGNOSIS — I10 PRIMARY HYPERTENSION: Chronic | ICD-10-CM

## 2023-01-31 PROCEDURE — 3074F SYST BP LT 130 MM HG: CPT | Performed by: SURGERY

## 2023-01-31 PROCEDURE — 99214 OFFICE O/P EST MOD 30 MIN: CPT | Performed by: SURGERY

## 2023-01-31 PROCEDURE — 3078F DIAST BP <80 MM HG: CPT | Performed by: SURGERY

## 2023-01-31 NOTE — PROGRESS NOTES
[facility]) Physicians   [employer] [facility] [employer]  Maryam Lott MD, [facility], [address], [address] [facility]    [facility] [address] . Phone: [phone]  Fax: [phone]          Chief Complaint   Patient presents with    Obesity     3rd pre-surg         HPI:     Lisa Cornejo is a very pleasant 50 y.o. female with Body mass index is 41.5 kg/m². / Chronic Obesity. Bhavya has been struggling for several years now with obesity. Bhavya feels the weight is an obstacle to achieve and perform things in daily living as well risk on health. Patient  is very determined to lose weight and be healthy, and is working towards  surgical weight loss to achieve this goal. Pre-operative clearance and work up pending. Working hard to keep good dietary habits as well level of activity. Patient denies any nausea, vomiting, fevers, chills, shortness of breath, chest pain, cough, constipation or difficulty urinating.     Pain Assessment   Denies any abdominal pain       Past Medical History:   Diagnosis Date    Cancer ([address]     skin    Fibroid uterus     Morbid obesity with BMI of 40.0-44.9, adult (formerly [facility])     Obsessive compulsive disorder     Obstructive sleep apnea, adult     Pre-operative clearance     Sleep apnea      Past Surgical History:   Procedure Laterality Date    FRACTURE SURGERY      right ankle and toe    HYSTERECTOMY, TOTAL ABDOMINAL (CERVIX REMOVED)  09/09/2016    Robotic total hysterectomy, L salpingectomy & R salpingectomy-oopherectomy    SKIN CANCER EXCISION       Family History   Problem Relation Age of Onset    Elevated Lipids Mother     Hypertension Mother     Diabetes Mother     High Blood Pressure Mother     Cancer Mother         breast    Breast Cancer Mother     Sleep Apnea Father     Kidney Disease Father     Hypertension Father     Diabetes Father     Diabetes Maternal Grandfather     Elevated Lipids Maternal Grandfather     Hypertension Maternal Grandfather     Coronary Art Dis Maternal Grandfather     Colon Cancer Paternal Grandmother     Depression Paternal Grandfather     Diabetes Paternal Grandfather     Hypertension Paternal Grandfather     Breast Cancer Maternal Cousin      Social History     Tobacco Use    Smoking status: Never    Smokeless tobacco: Never   Substance Use Topics    Alcohol use: Yes     Comment: soc     I counseled the patient on the importance of not smoking and risks of ETOH. Allergies   Allergen Reactions    Morphine Itching    Keflex [Cephalexin] Diarrhea and Nausea And Vomiting     Vitals:    01/31/23 1154   BP: 128/78   Pulse: 81   Resp: 18   SpO2: 97%   Weight: 265 lb (120.2 kg)   Height: 5' 7\" (1.702 m)       Body mass index is 41.5 kg/m².     Lab Results   Component Value Date/Time    WBC 6.4 04/29/2022 08:42 AM    RBC 4.56 04/29/2022 08:42 AM    HGB 14.0 04/29/2022 08:42 AM    HCT 41.3 04/29/2022 08:42 AM    MCV 90.6 04/29/2022 08:42 AM    MCH 30.7 04/29/2022 08:42 AM    MCHC 33.9 04/29/2022 08:42 AM    MPV 8.4 04/29/2022 08:42 AM    NEUTOPHILPCT 62.5 04/29/2022 08:42 AM    LYMPHOPCT 27.0 04/29/2022 08:42 AM    MONOPCT 6.3 04/29/2022 08:42 AM    EOSRELPCT 3.4 04/29/2022 08:42 AM    BASOPCT 0.8 04/29/2022 08:42 AM    NEUTROABS 4.0 04/29/2022 08:42 AM    LYMPHSABS 1.7 04/29/2022 08:42 AM    MONOSABS 0.4 04/29/2022 08:42 AM    EOSABS 0.2 04/29/2022 08:42 AM     Lab Results   Component Value Date/Time     06/23/2022 02:26 PM    K 4.2 06/23/2022 02:26 PM     06/23/2022 02:26 PM    CO2 26 06/23/2022 02:26 PM    ANIONGAP 11 06/23/2022 02:26 PM    GLUCOSE 84 06/23/2022 02:26 PM    BUN 12 06/23/2022 02:26 PM    CREATININE 0.9 06/23/2022 02:26 PM    LABGLOM >60 06/23/2022 02:26 PM    GFRAA >60 06/23/2022 02:26 PM    CALCIUM 9.9 06/23/2022 02:26 PM    PROT 6.7 04/29/2022 08:42 AM    LABALBU 4.9 06/23/2022 02:26 PM    AGRATIO 2.2 04/29/2022 08:42 AM    BILITOT 0.3 04/29/2022 08:42 AM    ALKPHOS 69 04/29/2022 08:42 AM    ALT 38 04/29/2022 08:42 AM    AST 19 04/29/2022 08:42 AM    GLOB 2.3 04/02/2021 11:52 AM     Lab Results   Component Value Date/Time    CHOL 172 04/29/2022 08:42 AM    TRIG 161 04/29/2022 08:42 AM    HDL 35 04/29/2022 08:42 AM    LDLCALC 105 04/29/2022 08:42 AM    LABVLDL 32 04/29/2022 08:42 AM     Lab Results   Component Value Date/Time    TSHREFLEX 3.28 04/29/2022 08:42 AM     No results found for: IRON, TIBC, LABIRON  No results found for: TJDPYGDZ89, FOLATE  Lab Results   Component Value Date/Time    VITD25 47.6 05/31/2016 12:00 PM     Lab Results   Component Value Date/Time    LABA1C 5.2 04/29/2022 08:42 AM    .5 04/29/2022 08:42 AM         Current Outpatient Medications:     amLODIPine (NORVASC) 5 MG tablet, Take 1 tablet by mouth daily, Disp: 90 tablet, Rfl: 1    buPROPion (WELLBUTRIN SR) 150 MG extended release tablet, Take 1 tablet by mouth 2 times daily, Disp: 60 tablet, Rfl: 5    Review of Systems - History obtained from the patient  General ROS: negative  Psychological ROS: negative  Ophthalmic ROS: negative  Neurological ROS: negative  ENT ROS: negative  Allergy and Immunology ROS: negative  Hematological and Lymphatic ROS: negative  Endocrine ROS: negative  Breast ROS: negative  Respiratory ROS: negative  Cardiovascular ROS: negative  Gastrointestinal ROS:negative  Genito-Urinary ROS: negative  Musculoskeletal ROS: negative   Skin ROS: negative    Physical Exam   Vitals Reviewed   Constitutional: Patient is oriented to person, place, and time. Patient appears well-developed and well-nourished. Patient is active and cooperative. Non-toxic appearance. No distress. HENT:   Head: Normocephalic and atraumatic. Head is without abrasion and without laceration. Hair is normal.   Right Ear: External ear normal. No lacerations. No drainage, swelling . Left Ear: External ear normal. No lacerations. No drainage, swelling. Nose/Mouth: face mask in place  Eyes: Conjunctivae, EOM and lids are normal. Right eye exhibits no discharge.  No foreign body present in the right eye. Left eye exhibits no discharge. No foreign body present in the left eye. No scleral icterus. Neck: Trachea normal and normal range of motion. No JVD present. Pulmonary/Chest: Effort normal. No accessory muscle usage or stridor. No apnea. No respiratory distress. Cardiovascular: Normal rate and no JVD. Abdominal: Normal appearance. Patient exhibits no distension. Abdomen is soft, obese, non tender. Musculoskeletal: Normal range of motion. Patient exhibits no edema. Neurological: Patient is alert and oriented to person, place, and time. Patient has normal strength. GCS eye subscore is 4. GCS verbal subscore is 5. GCS motor subscore is 6. Skin: Skin is warm and dry. No abrasion and no rash noted. Patient is not diaphoretic. No cyanosis or erythema. Psychiatric: Patient has a normal mood and affect. Speech is normal and behavior is normal. Cognition and memory are normal.       Maritza Barnes is 50 y.o. female, Body mass index is 41.5 kg/m². pre surgery, has lost 5 lbs since last visit. The patient underwent extensive dietary counseling with registered dietician. I have reviewed, discussed and agree with the dietary plan. Patient is trying hard to keep good dietary and behavior modifications. Patient is monitoring portion sizes, food choices and liquid calories. Patient is trying to exercise regularly as much as possible. Obesity as a disease is considered a high risk to patients overall health and should therefore be considered a high risk disease state. Advised the patient that not getting there weight under control, that could increase risk of complications/worsening of those conditions on the long-term. (Goal of weight loss surgery is to alleviate/control some of those co-morbidities)    Now with Covid-19 pandemic, CDC and health authorities does classify obese patients as vulnerable and high risk as well.   Which makes weight loss a priority for improvement of their wellbeing and overall health. I encouraged the patient to continue exercise and keeping healthy eating habits. Discussed pre-op labs and work up till now. Also counseled the patient extensively on Surgery. I did explain thoroughly to the patient that compliance with pre- and post op diet and other recommendations are integral part to improve the chances of successful weight loss and also not following it could end with serious health complications. Some strategies discussed today include but not limited to : 30/30/30 minutes rule, food diary, avoid fast food and packing/planing ahead, & increasing exercise. Also stressed to the patient importance of taking the multivitamins as instructed, otherwise risk significant complications. The visit today, included any number of the following: Bariatric Preoperative work up/protocols, review of labs, imaging, provider notes, outside hospital records, performing examination/evaluation, counseling patient and/or family, ordering medications/tests, placing referrals and communication with referring physicians, coordination of care; discussing dietary plan/recall with the patient as well with registered dietitian and documentation in the EHR. Of note, the above was done during same day of the actual patient encounter. Bhavya is here for her third presurgical visit. Patient making very good progress. The patient needs 6-month months consecutive visits. We will see the patient next month for continued follow-up. Urged the patient to go ahead and schedule the remaining preoperative clearances. We discussed how her excess weight affects her overall health and importance of weight loss, healthy diet and active lifestyle to alleviate those co morbid conditions, otherwise risk deterioration. Morbid Obesity: Body mass index is 41.5 kg/m². [x] Continue to make dietary and lifestyle modifications.   [x] Plan for Future laparoscopic sleeve gastrectomy. [x] Return for follow-up next month. Chronic GERD:   [x] Continue to make dietary and lifestyle modifications. [] Continue Omeprazole. [] Continue Famotidine. [x] Plan for EGD to evaluate the stomach. Essential Hypertension:  [x] Continue to make dietary and lifestyle modifications. [x] Reviewed the importance of checking blood pressure. [x] Continue to follow up with their PCP for medication management and monitoring. Obstructive Sleep Apnea:   [x] Continue to make dietary and lifestyle modifications. [x] Reviewed the importance of wearing / compliance with CPAP/BIPAP. [x] Continue to follow up with their sleep medicine provider for CPAP/BIPAP management and monitoring. Patient advised that its their responsibility to follow up for studies, referrals and/or labs ordered today.

## 2023-01-31 NOTE — PATIENT INSTRUCTIONS
Patient received dietary handouts and education.     Plan/Recommendations:   - Eat Bfast, Lunch, Dinner daily  - Try Protein Powder  - Buy single serving items and measure portions

## 2023-01-31 NOTE — PROGRESS NOTES
Bhavya Clay lost 5 lbs over 8 weeks. Pt reports leaving job and working to find new position so not in typically eating routine. Wake up 8-9 AM  Breakfast: None  Snack: None  Lunch: None OR 12 PM: Out- soup + 1/2 sandwich   Snack: None  Dinner: 4 PM: [employer] - bean burrito + taco   Snack: None OR handful pretzels/becky   Bed around 12:30- 1 am, feeling more rested     Is pt consuming smaller portions? yes reading labels, taking less    Is pt consuming at least 64 oz of fluids per day? yes 60 oz water/ sf flavors/ unsweet tea w/ sweetener     Is pt consuming carbonated, caffeinated, or sugary beverages? yes diet coke 2-3/day  + 2 glasses wine or beer/weekend    Has pt sampled Unjury and/or Nectar protein? Plans to buy today     Has patient attended a support group?  Scheduled  March 27th    Exercise: inconsistent, was walking 3 miles 3-4X/week but can't with time change, has [employer]     Plan/Recommendations:   - Eat Bfast, Lunch, Dinner daily  - Try Protein Powder  - Buy single serving items and measure portions     Handouts: None    Emiliano Jones RD, LD

## 2023-02-06 ENCOUNTER — TELEPHONE (OUTPATIENT)
Dept: BARIATRICS/WEIGHT MGMT | Age: 48
End: 2023-02-06

## 2023-02-06 NOTE — TELEPHONE ENCOUNTER
PSYCHOLOGICAL AND NEUROPSYCHOLOGICAL TESTING REQUEST FORM  Request Form Number: [id number] Received Date/Time: 2/6/2023 12:31 PM CST    Prior auth submitted on 02/06/2023 for [email or web address]

## 2023-02-08 ENCOUNTER — TELEMEDICINE (OUTPATIENT)
Dept: INTERNAL MEDICINE CLINIC | Age: 48
End: 2023-02-08
Payer: COMMERCIAL

## 2023-02-08 DIAGNOSIS — R68.89 FLU-LIKE SYMPTOMS: ICD-10-CM

## 2023-02-08 DIAGNOSIS — R05.1 ACUTE COUGH: Primary | ICD-10-CM

## 2023-02-08 LAB
INFLUENZA A ANTIGEN, POC: POSITIVE
INFLUENZA B ANTIGEN, POC: NEGATIVE

## 2023-02-08 PROCEDURE — 87804 INFLUENZA ASSAY W/OPTIC: CPT | Performed by: NURSE PRACTITIONER

## 2023-02-08 PROCEDURE — 99213 OFFICE O/P EST LOW 20 MIN: CPT | Performed by: NURSE PRACTITIONER

## 2023-02-08 RX ORDER — OSELTAMIVIR PHOSPHATE 75 MG/1
75 CAPSULE ORAL 2 TIMES DAILY
Qty: 10 CAPSULE | Refills: 0 | Status: SHIPPED | OUTPATIENT
Start: 2023-02-08 | End: 2023-02-13

## 2023-02-08 RX ORDER — BENZONATATE 200 MG/1
200 CAPSULE ORAL 3 TIMES DAILY PRN
Qty: 30 CAPSULE | Refills: 0 | Status: SHIPPED | OUTPATIENT
Start: 2023-02-08 | End: 2023-02-15

## 2023-02-08 NOTE — PROGRESS NOTES
2023    TELEHEALTH EVALUATION -- Audio/Visual (During [facility]-14 public health emergency)    Chief Complaint   Patient presents with    Cough     HPI:    Bhavya Mccoy Rater (:  1975) has requested an audio/video evaluation for the following concern(s):    VV for flu like symptoms   Started with mild symptoms , Monday they worsened significantly. Cough, fever, chills, congestion, mild sore throat, body aches, fatigue   Denies SOB   Home covid test was negative   Denies any known exposures to flu, rsv, covid   Using OTC medications with some mild relief     Review of Systems  Negative other than HPI     Prior to Visit Medications    Medication Sig Taking? Authorizing Provider   benzonatate (TESSALON) 200 MG capsule Take 1 capsule by mouth 3 times daily as needed for Cough Yes LUZ MARIA Becerril CNP   amLODIPine (NORVASC) 5 MG tablet Take 1 tablet by mouth daily  Jamesetta Favre, MD   buPROPion [facility] SR) 150 MG extended release tablet Take 1 tablet by mouth 2 times daily  LUZ MARIA Hirsch CNP       Social History     Tobacco Use    Smoking status: Never    Smokeless tobacco: Never   Vaping Use    Vaping Use: Never used   Substance Use Topics    Alcohol use: Yes     Comment: soc    Drug use: No            PHYSICAL EXAMINATION:  [ INSTRUCTIONS:  \"[x]\" Indicates a positive item  \"[]\" Indicates a negative item  -- DELETE ALL ITEMS NOT EXAMINED]  Vital Signs: (As obtained by patient/caregiver or practitioner observation)    No flowsheet data found. Physical Exam  Constitutional:       General: She is not in acute distress. Appearance: Normal appearance. She is ill-appearing (not acute). HENT:      Head: Normocephalic and atraumatic. Right Ear: Tympanic membrane and ear canal normal.      Left Ear: Tympanic membrane and ear canal normal.      Nose:      Comments: Sounds congested   Pulmonary:      Effort: Pulmonary effort is normal. No respiratory distress.       Comments: Cough without conversational dyspnea noted during VV  Neurological:      Mental Status: She is alert and oriented to person, place, and time. Mental status is at baseline. Psychiatric:         Mood and Affect: Mood normal.         Behavior: Behavior normal.      Other pertinent observable physical exam findings-     Due to this being a TeleHealth encounter, evaluation of the following organ systems is limited: Vitals/Constitutional/EENT/Resp/CV/GI//MS/Neuro/Skin/Heme-Lymph-Imm. ASSESSMENT/PLAN:  Acute cough/ Flu-like symptoms  Acute, uncontrolled. Tessalon sent to pharmacy for cough   Coming to office now for viral testing     Supportive care reviewed  Humidified air  Honey lemon tea  Nasal rinse prn  Flonase daily  NSAIDs/ tylenol for pain and fever  Mucinex prn for congestion   Ambulation benefits reviewed- encouraged   Daily multivitamin with zinc  Prone sleeping for SOB   transmission precautions reviewed   Check pulse ox and go to ED/ urgent care if drops below 92% and/ or uncontrolled worsening dyspnea     - Rapid RSV Antigen; Future  - RAPID INFLUENZA A/B ANTIGENS; Future  - COVID-19; Future    An  electronic signature was used to authenticate this note. --LUZ MARIA Ni - CNP on 2/8/2023 at 1:16 PM        Bhavya Stubbs, was evaluated through a synchronous (real-time) audio-video encounter. The patient (or guardian if applicable) is aware that this is a billable service, which includes applicable co-pays. This Virtual Visit was conducted with patient's (and/or legal guardian's) consent. The visit was conducted pursuant to the emergency declaration under the [facility], [address] [place] [address] [facility] authority and the [place] [employer] [place] General Act. Patient identification was verified, and a caregiver was present when appropriate.    The patient was located at Home: [address] [employer]  Provider was located at Facility (Appt Dept): [address],  [address]

## 2023-02-09 LAB — RSV RAPID ANTIGEN: NEGATIVE

## 2023-02-09 NOTE — PROGRESS NOTES
Patient reached __X__ yes  _____ no   VM instructions left ____ yes   phone number ________                                ____ no-office notified          Date _2/24/23________  Time __1130_____  Arrival __0930  hosp-endo____    Nothing to eat or drink after midnight-follow your doctors prep instructions-this may include taking a second dose of your prep after midnight  Responsible adult 25 or older to stay on site while you are here-drive you home-stay with you after  Follow any instructions your doctors office has given you  Bring a complete list of all your medications and supplements including name,dose,how often taken the day of your procedure  If you normally take the following medications in the morning please do so the AM of your procedure with a small sip of water       Heart,blood pressure,seizure,thyroid or breathing medications-use your inhalers-bring any rescue inhalers with you DOS       DO NOT take blood pressure medications ending in \"joyce\" or \"pril\" the AM of procedure or evening prior  Dr Sepulveda Feeling patients are not to take any medications the AM of surgery  Take half or your normal dose of any long acting insulins the night before your procedure-do not take any diabetic medications the AM of procedure  Follow your doctors instructions regarding stopping or taking  any blood thinners-if you do not have instructions-call them  Any questions call your doctor  Other ______________________________________________________________    [facility] POLICY(subject to change)             The current policy is 2 visitors per patient. There are no children allowed. Mask at discretion of facility. Visiting hours are 8a-8p. Overnight visitors will be at the discretion of the nurse. All policies are subject to change.

## 2023-02-10 ENCOUNTER — TELEPHONE (OUTPATIENT)
Dept: SLEEP CENTER | Age: 48
End: 2023-02-10

## 2023-02-10 LAB — SARS-COV-2: DETECTED

## 2023-02-10 NOTE — TELEPHONE ENCOUNTER
Left vm for the pt to return my call - need to reschedule hst - had to cancel due to being sick.      Originally scheduled at [facility]

## 2023-02-14 ENCOUNTER — TELEPHONE (OUTPATIENT)
Dept: BARIATRICS/WEIGHT MGMT | Age: 48
End: 2023-02-14

## 2023-02-15 ENCOUNTER — HOSPITAL ENCOUNTER (OUTPATIENT)
Dept: SLEEP CENTER | Age: 48
Discharge: HOME | End: 2023-02-15
Payer: COMMERCIAL

## 2023-02-15 DIAGNOSIS — R06.83 SNORING: ICD-10-CM

## 2023-02-15 DIAGNOSIS — G47.10 HYPERSOMNIA: ICD-10-CM

## 2023-02-15 PROCEDURE — 95806 SLEEP STUDY UNATT&RESP EFFT: CPT

## 2023-02-17 ENCOUNTER — TELEPHONE (OUTPATIENT)
Dept: BARIATRICS/WEIGHT MGMT | Age: 48
End: 2023-02-17

## 2023-02-20 NOTE — PROGRESS NOTES
Pt.notified of EGD time change to 2/24/23 at 1015, arrival 0845.  Instructed pt.to return my call if she is still having covid symptoms.

## 2023-02-21 ENCOUNTER — TELEPHONE (OUTPATIENT)
Dept: PULMONOLOGY | Age: 48
End: 2023-02-21

## 2023-02-21 PROBLEM — E78.5 DYSLIPIDEMIA (HIGH LDL; LOW HDL): Status: ACTIVE | Noted: 2023-02-21

## 2023-02-21 ASSESSMENT — ENCOUNTER SYMPTOMS
GASTROINTESTINAL NEGATIVE: 1
ALLERGIC/IMMUNOLOGIC NEGATIVE: 1
RESPIRATORY NEGATIVE: 1
EYES NEGATIVE: 1

## 2023-02-21 NOTE — PROGRESS NOTES
Bhavya Marques         : 1975  [facility]    Diagnosis: [x] DEMETRIA (G47.33) [] CSA (G47.31) [] Apnea (G47.30)   Length of Need: [x] 18 Months [] 99 Months [] Other:    Machine (STEPHANIE!): [] Respironics Dream Station   2   Auto [x] ResMed AirSense     Auto S11 [] Other:     [x]  CPAP () [] Bilevel ()   Mode: [x] Auto [] Spontaneous    Mode: [] Auto [] Spontaneous      P min 7 cmH2O  P max 17 cmH2O      Comfort Settings:   - Ramp Pressure: 4 cmH2O                                        - Ramp time: 15 min                                     -  Flex/EPR - 3 full time                                    - For ResMed Bilevel (TiMax-4 sec   TiMin- 0.2 sec)     Humidifier: [x] Heated ()        [x] Water chamber replacement ()/ 1 per 6 months        Mask:   [x] Nasal () /1 per 3 months [] Full Face () /1 per 3 months   [x] Patient choice -Size and fit mask [] Patient Choice - Size and fit mask   [] Dispense:  [] Dispense:    [x] Headgear () / 1 per 3 months [] Headgear () / 1 per 3 months   [x] Replacement Nasal Cushion ()/2 per month [] Interface Replacement ()/1 per month   [] Replacement Nasal Pillows ()/2 per month         Tubing: [x] Heated ()/1 per 3 months    [] Standard ()/1 per 3 months [] Other:           Filters: [x] Non-disposable ()/1 per 6 months     [x] Ultra-Fine, Disposable ()/2 per month        Miscellaneous: [] Chin Strap ()/ 1 per 6 months [] O2 bleed-in:       LPM   [] Oximetry on CPAP/Bilevel []  Other:    [x] Modem: ()         Start Order Date: 23    MEDICAL JUSTIFICATION:  I, the undersigned, certify that the above prescribed supplies are medically necessary for this patients wellbeing. In my opinion, the supplies are both reasonable and necessary in reference to accepted standards of medicalpractice in treatment of this patients condition.     Mart Spaulding MD      NPI: [id number]       Order Signed Date: 23    Electronically signed by Noelle Palomo MD on 2023 at [facility]  1975  [address]  [phone] (home) [phone] (work)  [phone] (mobile)      Insurance Info (confirm with patient if correct):  Payer/Plan Subscr  Sex Relation Sub.  Ins. ID Effective Group Num

## 2023-02-21 NOTE — PROGRESS NOTES
[address] [facility]    2/22/2023    TELEHEALTH EVALUATION -- Audio/Visual    Subjective:      Patient ID: Tyler Barragan is a 50 y.o. female has requested an audio/video evaluation. HPI    Due to the COVID-19 restrictions on close contact interactions the patient's monthly presurgical visit was conducted via audio/video in [place] of a face to face visit. Patient has consented to have this visit conducted via audio/video and I am conducting it from the office. The patient is here through telemedicine for their bariatric surgery presurgical visit for future weight loss. She has made several attempts at weight loss in the past without success and now wishes to pursue bariatric surgery. She is working to change her dietary behaviors and lose weight to improve comorbid conditions such as hypertension and obstructive sleep apnea. Tyler Barragan is a 50 y.o. female with Body mass index is 41.04 kg/m².     Past Medical History:   Diagnosis Date    Cancer ([facility] [address]     skin    Fibroid uterus     Morbid obesity with BMI of 40.0-44.9, adult ([facility])     Obsessive compulsive disorder     Obstructive sleep apnea, adult     Pre-operative clearance     Sleep apnea      Past Surgical History:   Procedure Laterality Date    FRACTURE SURGERY      right ankle and toe    HYSTERECTOMY, TOTAL ABDOMINAL (CERVIX REMOVED)  09/09/2016    Robotic total hysterectomy, L salpingectomy & R salpingectomy-oopherectomy    SKIN CANCER EXCISION       Family History   Problem Relation Age of Onset    Elevated Lipids Mother     Hypertension Mother     Diabetes Mother     High Blood Pressure Mother     Cancer Mother         breast    Breast Cancer Mother     Sleep Apnea Father     Kidney Disease Father     Hypertension Father     Diabetes Father     Diabetes Maternal Grandfather     Elevated Lipids Maternal Grandfather     Hypertension Maternal Grandfather     Coronary Art Dis Maternal Grandfather     Colon Cancer Paternal Grandmother     Depression Paternal Grandfather     Diabetes Paternal Grandfather     Hypertension Paternal Grandfather     Breast Cancer Maternal Cousin      Social History     Tobacco Use    Smoking status: Never    Smokeless tobacco: Never   Substance Use Topics    Alcohol use: Yes     Comment: soc     I counseled the patient on the importance of not smoking and risks of ETOH. Allergies   Allergen Reactions    Morphine Itching    Keflex [Cephalexin] Diarrhea and Nausea And Vomiting     Vitals:    02/22/23 0700   Weight: 262 lb (118.8 kg)   Height: 5' 7\" (1.702 m)     Body mass index is 41.04 kg/m².     Current Outpatient Medications:     amLODIPine (NORVASC) 5 MG tablet, Take 1 tablet by mouth daily, Disp: 90 tablet, Rfl: 1    buPROPion (WELLBUTRIN SR) 150 MG extended release tablet, Take 1 tablet by mouth 2 times daily, Disp: 60 tablet, Rfl: 5    Lab Results   Component Value Date/Time    WBC 6.6 02/13/2023 02:55 PM    RBC 4.54 02/13/2023 02:55 PM    HGB 14.0 02/13/2023 02:55 PM    HCT 40.3 02/13/2023 02:55 PM    MCV 88.7 02/13/2023 02:55 PM    MCH 30.9 02/13/2023 02:55 PM    MCHC 34.8 02/13/2023 02:55 PM    MPV 8.6 02/13/2023 02:55 PM    NEUTOPHILPCT 49.0 02/13/2023 02:55 PM    LYMPHOPCT 39.0 02/13/2023 02:55 PM    MONOPCT 10.0 02/13/2023 02:55 PM    EOSRELPCT 0.0 02/13/2023 02:55 PM    BASOPCT 0.0 02/13/2023 02:55 PM    NEUTROABS 3.4 02/13/2023 02:55 PM    LYMPHSABS 2.6 02/13/2023 02:55 PM    MONOSABS 0.7 02/13/2023 02:55 PM    EOSABS 0.0 02/13/2023 02:55 PM     Lab Results   Component Value Date/Time     02/13/2023 02:55 PM    K 4.4 02/13/2023 02:55 PM     02/13/2023 02:55 PM    CO2 26 02/13/2023 02:55 PM    ANIONGAP 13 02/13/2023 02:55 PM    GLUCOSE 88 02/13/2023 02:55 PM    BUN 14 02/13/2023 02:55 PM    CREATININE 0.8 02/13/2023 02:55 PM    LABGLOM >60 02/13/2023 02:55 PM    GFRAA >60 06/23/2022 02:26 PM    CALCIUM 10.0 02/13/2023 02:55 PM    PROT 7.0 02/13/2023 02:55 PM    LABALBU 4.6 02/13/2023 02:55 PM    AGRATIO 1.9 02/13/2023 02:55 PM    BILITOT 0.6 02/13/2023 02:55 PM    ALKPHOS 64 02/13/2023 02:55 PM    ALT 44 02/13/2023 02:55 PM    AST 19 02/13/2023 02:55 PM    GLOB 2.3 04/02/2021 11:52 AM     Lab Results   Component Value Date/Time    CHOL 182 02/13/2023 02:55 PM    TRIG 106 02/13/2023 02:55 PM    HDL 36 02/13/2023 02:55 PM    LDLCALC 125 02/13/2023 02:55 PM    LABVLDL 21 02/13/2023 02:55 PM     Lab Results   Component Value Date/Time    TSHREFLEX 2.26 02/13/2023 02:55 PM     Lab Results   Component Value Date/Time    IRON 80 02/13/2023 02:55 PM    TIBC 295 02/13/2023 02:55 PM    LABIRON 27 02/13/2023 02:55 PM     Lab Results   Component Value Date/Time    UYKTLBNE56 597 02/13/2023 02:55 PM    FOLATE 9.84 02/13/2023 02:55 PM     Lab Results   Component Value Date/Time    VITD25 30.4 02/13/2023 02:55 PM     Lab Results   Component Value Date/Time    LABA1C 5.2 02/13/2023 02:55 PM    .5 02/13/2023 02:55 PM       Review of Systems   Constitutional: Negative. HENT: Negative. Eyes: Negative. Respiratory: Negative. Cardiovascular: Negative. Gastrointestinal: Negative. Endocrine: Negative. Genitourinary: Negative. Musculoskeletal: Negative. Skin: Negative. Allergic/Immunologic: Negative. Neurological: Negative. Hematological: Negative. Psychiatric/Behavioral: Negative. PHYSICAL EXAMINATION:    Constitutional: [x] Appears well-developed and well-nourished [x] No apparent distress      [] Abnormal-   Mental status  [x] Alert and awake  [x] Oriented to person/place/time [x]Able to follow commands      Eyes:  EOM    [x]  Normal  [] Abnormal-  Sclera  [x]  Normal  [] Abnormal -         Discharge [x]  None visible  [] Abnormal -    HENT:   [x] Normocephalic, atraumatic.   [] Abnormal     Neck: [x] No visualized mass     Pulmonary/Chest: [x] Respiratory effort normal.  [x] No visualized signs of difficulty breathing or respiratory distress        [] Abnormal-      Musculoskeletal:   [] Normal gait with no signs of ataxia         [x] Normal range of motion of neck        [] Abnormal-     Neurological:        [x] No Facial Asymmetry (Cranial nerve 7 motor function) (limited exam to video visit)          [x] No gaze palsy        [] Abnormal-         Skin:        [x] No significant exanthematous lesions or discoloration noted on facial skin         [] Abnormal-            Psychiatric:       [x] Normal Affect [] No Hallucinations        [] Abnormal-     Other pertinent observable physical exam findings-     Due to this being a TeleHealth encounter, evaluation of the following organ systems is limited: Vitals/Constitutional/EENT/Resp/CV/GI//MS/Neuro/Skin/Heme-Lymph-Imm. Assessment and Plan:   Patient is here for their 4th presurgery visit for sleeve via telemedicine, down 3 lbs. The patient's current Body mass index is 41.04 kg/m². (2/22/23). She is making dietary and behavior modifications, but needs to makes sure she is getting protein with all meals/snacks, eliminate soda and avoid higher fat foods. She talked with the registered dietitian for continued follow up. Discussed with dietitian and I agree with recommendations and plan. She is typically walking for exercise, but had COVID and flu recently. She is feeling much better and encouraged her to get back to physical activity as weather permits. Patient with h/o hysterectomy so does not need to receive instruction that it is recommended to avoid pregnancy following bariatric surgery for at least 2 years to allow them to have stable weight loss and to help avoid increased risk of vitamin deficiencies and malnutrition. Patient counseled on the avoidance of tobacco/nicotine, alcohol and illicit drug abuse. Reviewed labs from 2/13/2023 as patient had not read My Chart message that had been sent.  Discussed preop work up which still needs EGD (scheduled 2/24/2023), psych evaluation (scheduled 3/28/2023), sleep clearance (Completed home sleep study 2/15/2023 and has moderate sleep apnea. Orders [facility]AP sent to [facility] and support group (scheduled 3/27/2023). We will see her back in 1 month for continued follow up or through telemedicine. Essential Hypertension:  [x] Continue to make dietary and lifestyle modifications per our recommendations. [x] Weight loss recommended. [x] Reviewed the importance of checking blood pressure. [x] Continue to follow up with their PCP for medication management (Norvasc) and monitoring. [] Follow up labs ordered today. Hyperlipidemia:   [x] Continue to make dietary and lifestyle modifications per our recommendations. [x] Weight loss recommended. [] Continue to follow up with their PCP for medication management and monitoring. [] Follow up labs ordered today. Obstructive Sleep Apnea:   [x] Continue to make dietary and lifestyle modifications per our recommendations. [x] Reviewed the importance of wearing your CPAP/BIPAP. [x] Continue to follow up with their sleep medicine provider for CPAP/BIPAP management and monitoring. Obesity:  [x] Continue to make dietary and lifestyle modifications per our recommendations. [x] Weight loss recommended. [x] Plan for Future laparoscopic sleeve gastrectomy. [x] Return for follow-up next month. Total encounter time: 30 minutes, including any number of the following: Bariatric Preoperative work up/protocols, review of labs, imaging, provider notes, outside hospital records, performing examination/evaluation, counseling patient and/or family, ordering medications/tests, placing referrals and communication with referring physicians, coordination of care; discussing exercise and physical activity; discussing dietary plan/recall with the patient as well with registered dietitian and documentation in the EHR. Of note, the above was done during same day of the actual patient encounter.      Obesity, as a disease, is considered high risk to a patients overall health and should therefore be considered a high risk disease state. Now with Covid-19 pandemic, CDC and health authorities do classify obese patients as vulnerable and high risk as well. Delaney Colbert, was evaluated through a synchronous (real-time) audio-video encounter. The patient (or guardian if applicable) is aware that this is a billable service, which includes applicable co-pays. This Virtual Visit was conducted with patient's (and/or legal guardian's) consent. The visit was conducted pursuant to the emergency declaration under the [address] [place] [address], [address] [place] [address] authority and the [employer] [place] General Act. Patient identification was verified, and a caregiver was present when appropriate. The patient was located at Home: [address]  Provider [facility] Pattie Ramírez [address] was used to authenticate this note.

## 2023-02-21 NOTE — TELEPHONE ENCOUNTER
Spoke with pt to review HST. Order to be sent to [facility] per pt request. F/U to be scheduled.

## 2023-02-21 NOTE — PATIENT INSTRUCTIONS
Goals in preparing for bariatric surgery  You should be giving up all beverages that have carbonation, sugar, and caffeine (Refer to the approved liquids list provided at initial visit). You should be drinking 64 ounces of low calorie (5 calories or less per serving) fluids per day. Suggestions include:  Water (you may add fresh lemon or lime)  Crystal Light  [place] Liquid Water Enhancer  Propel Zero  Powerade Zero/Gatorade Zero  Isopure  [id number]  SOBE Lifewater Zero  Vitamin Water Zero  Sugar Free Gary-Aid  You should be eating 4-6 times per day. Three small meals plus 1-2 snacks per day is your goal. This balances your calories and nutrients evenly throughout the day and helps to boost your metabolism. Refer to the snack list provided at your initial visit. Aim for a protein at every snack, plus a fruit, vegetable or starch. You should be eating protein at every meal and snack. Protein is typically found in animal sources, i.e. chicken, lean beef, lean pork, fish, seafood and eggs. It is also found in low-fat dairy sources such as skim or 1% milk, low-fat yogurt, low-fat cheese, and low-fat cottage cheese. Plant based sources of protein include peanut butter, beans, and soy. You should be utilizing the 9-inch plate method. Eating on a smaller plate will help you control portion size, but what you put on your plate counts also. Make ¼ of your plate lean protein, ¼ carbohydrate (fruit, grain or starchy vegetables) and ½ the plate non-starchy vegetables. You should eliminate caffeine. Caffeine is dehydrating. After surgery, it's very important to stay hydrated. Giving up caffeine before surgery will help you focus on the changes necessary to be successful after surgery. There are many decaffeinated coffee and tea products available in grocery stores. You should eliminate alcohol. You must abstain from alcohol prior to surgery and for at least the first 6-9 months after surgery.  Although you may have alcohol in the future it is important to understand, as a bariatric surgery patient, there is a risk of it negatively affecting your weight loss goals and it can pose a risk for addiction. You should be reducing added fat and sugar in your diet. Frying foods adds too much fat and calories, but you could use an air fryer as it requires significantly less oil. Baking, broiling, or grilling meats add flavor without unhealthy fats. Using cooking oil spray and spray butter products are also healthy options that will aid in your weight loss. Foods high in added sugars are often also high in calories and low in nutrients. If you are a smoker or use e-cigarettes/vaping, you must eliminate. You must be nicotine free and/or not vaping for at least 8 weeks before your surgery can be scheduled. You will be screened for nicotine through lab work. Eating habits after surgery need to be a long-term change. Eating habits are often so ingrained that it can be difficult to change. It is important to practice new eating habits prior to surgery to mentally prepare yourself for the challenge ahead. Also, remember that overall health, age, and genetics make each person's weight loss progress different. Do not compare your progress (pre- or post-operatively), the amount you eat, or your exercise to other patients. In addition, it is the responsibility of the patient to schedule and follow up on labs and tests completed during the pre-surgical period. Results will be reviewed at each visit. **IT IS IMPORTANT TO KNOW THAT YOU MUST COMPLETE ALL REQUIRED DIETARY CHANGES, TESTS, CLEARANCES AND ACTIVITIES BEFORE A SURGERY DATE CAN BE GIVEN. IF YOU HAVE NOT MET ANY OF THESE REQUIREMENTS THEN YOU WILL NOT BE GIVEN A SURGERY DATE UNTIL THEY HAVE BEEN MET TO OUR SATISFACTION. THIS IS NOT ONLY DONE TO HELP YOU BE SUCCESSFUL AFTER SURGERY, BUT TO BE SAFE AS WELL.**    Patient received dietary handouts and education.

## 2023-02-22 ENCOUNTER — TELEMEDICINE (OUTPATIENT)
Dept: BARIATRICS/WEIGHT MGMT | Age: 48
End: 2023-02-22
Payer: COMMERCIAL

## 2023-02-22 VITALS — BODY MASS INDEX: 41.12 KG/M2 | HEIGHT: 67 IN | WEIGHT: 262 LBS

## 2023-02-22 DIAGNOSIS — G47.33 OBSTRUCTIVE SLEEP APNEA, ADULT: ICD-10-CM

## 2023-02-22 DIAGNOSIS — E66.01 MORBID OBESITY WITH BMI OF 40.0-44.9, ADULT (HCC): Chronic | ICD-10-CM

## 2023-02-22 DIAGNOSIS — E78.5 DYSLIPIDEMIA (HIGH LDL; LOW HDL): ICD-10-CM

## 2023-02-22 DIAGNOSIS — I10 PRIMARY HYPERTENSION: Primary | Chronic | ICD-10-CM

## 2023-02-22 PROCEDURE — 99214 OFFICE O/P EST MOD 30 MIN: CPT | Performed by: NURSE PRACTITIONER

## 2023-02-22 NOTE — PROGRESS NOTES
Bhavya King lost 3 lbs over past ~ 3 weeks. Had COVID and flu but already feeling better. Has had less of an appetite recently d/t being sick - but also started a new job which has been stressful so has been having a lot of small snacks throughout the day and her eating routine is off. Is pt eating at least 4 times everyday? Yes - eating 4 snacks and a dinner     Is pt eating a lean protein source with all meals and snacks? No   Snacks - apples and oranges throughout the day   Dinner - Reports she eats out a lot for dinner as it is just her  and her - salad with blackened chicken with cheese/ranch OR chicken fajitas OR chicken quesadilla OR part of veggie pad Latvian     Has pt decreased their portions using the plate method? Reports she feels they are reasonable     Is pt choosing low fat/sugar free options? No - higher fat options when eating out     Is pt drinking at least 64 oz of clear liquids everyday? Around this     Has pt stopped drinking carbonation, caffeinated, and sugar sweetened beverages? No - Drinking vitamin water zero, 1 diet coke per day (has been decreasing), unsweetened tea with sweet n low     Has pt sampled Unjury and/or Nectar protein? Tried and tolerated    Has pt attended a support group?  Scheduled for 3/27 SG     Participating in intentional exercise? walking on nicer days     Plan/Recommendations:   Include protein with all snacks - discussed having protein at least 4 x day   Avoid higher fat foods - needs to choose some alternatives when eating out d/t higher fat content/avoiding fried foods / cooking more foods at home - pt reports she knows what to do she just needs to do it   Discussed taking bagged salad to work   Eliminate soda     Handouts: none     Sandra Gaston, RD, LD

## 2023-02-23 ENCOUNTER — TELEPHONE (OUTPATIENT)
Dept: BARIATRICS/WEIGHT MGMT | Age: 48
End: 2023-02-23

## 2023-02-23 NOTE — TELEPHONE ENCOUNTER
Patient is scheduled for egd on 2/24/23 w MID. FF prior auth stating that ins is pending the approval due to information on submission please call insurance and f/u w information regarding procedure reasoning/codes.

## 2023-02-24 ENCOUNTER — HOSPITAL ENCOUNTER (OUTPATIENT)
Age: 48
Setting detail: OUTPATIENT SURGERY
Discharge: HOME | End: 2023-02-24
Attending: SURGERY | Admitting: SURGERY
Payer: COMMERCIAL

## 2023-02-24 ENCOUNTER — ANESTHESIA (OUTPATIENT)
Dept: ENDOSCOPY | Age: 48
End: 2023-02-24
Payer: COMMERCIAL

## 2023-02-24 ENCOUNTER — ANESTHESIA EVENT (OUTPATIENT)
Dept: ENDOSCOPY | Age: 48
End: 2023-02-24
Payer: COMMERCIAL

## 2023-02-24 VITALS
RESPIRATION RATE: 16 BRPM | HEART RATE: 73 BPM | BODY MASS INDEX: 39.95 KG/M2 | SYSTOLIC BLOOD PRESSURE: 132 MMHG | HEIGHT: 68 IN | OXYGEN SATURATION: 98 % | DIASTOLIC BLOOD PRESSURE: 86 MMHG | TEMPERATURE: 97.7 F | WEIGHT: 263.6 LBS

## 2023-02-24 DIAGNOSIS — K21.9 GASTROESOPHAGEAL REFLUX DISEASE, UNSPECIFIED WHETHER ESOPHAGITIS PRESENT: ICD-10-CM

## 2023-02-24 PROCEDURE — 2580000003 HC RX 258: Performed by: NURSE ANESTHETIST, CERTIFIED REGISTERED

## 2023-02-24 PROCEDURE — 7100000011 HC PHASE II RECOVERY - ADDTL 15 MIN: Performed by: SURGERY

## 2023-02-24 PROCEDURE — 43239 EGD BIOPSY SINGLE/MULTIPLE: CPT | Performed by: SURGERY

## 2023-02-24 PROCEDURE — 3609012400 HC EGD TRANSORAL BIOPSY SINGLE/MULTIPLE: Performed by: SURGERY

## 2023-02-24 PROCEDURE — 7100000010 HC PHASE II RECOVERY - FIRST 15 MIN: Performed by: SURGERY

## 2023-02-24 PROCEDURE — 3700000000 HC ANESTHESIA ATTENDED CARE: Performed by: SURGERY

## 2023-02-24 PROCEDURE — 88305 TISSUE EXAM BY PATHOLOGIST: CPT

## 2023-02-24 PROCEDURE — 2500000003 HC RX 250 WO HCPCS: Performed by: NURSE ANESTHETIST, CERTIFIED REGISTERED

## 2023-02-24 PROCEDURE — 2709999900 HC NON-CHARGEABLE SUPPLY: Performed by: SURGERY

## 2023-02-24 PROCEDURE — 6360000002 HC RX W HCPCS: Performed by: NURSE ANESTHETIST, CERTIFIED REGISTERED

## 2023-02-24 RX ORDER — SODIUM CHLORIDE 9 MG/ML
INJECTION, SOLUTION INTRAVENOUS CONTINUOUS PRN
Status: DISCONTINUED | OUTPATIENT
Start: 2023-02-24 | End: 2023-02-24 | Stop reason: SDUPTHER

## 2023-02-24 RX ORDER — OMEPRAZOLE 20 MG/1
20 CAPSULE, DELAYED RELEASE ORAL
Qty: 90 CAPSULE | Refills: 1 | Status: SHIPPED | OUTPATIENT
Start: 2023-02-24

## 2023-02-24 RX ORDER — PROPOFOL 10 MG/ML
INJECTION, EMULSION INTRAVENOUS PRN
Status: DISCONTINUED | OUTPATIENT
Start: 2023-02-24 | End: 2023-02-24 | Stop reason: SDUPTHER

## 2023-02-24 RX ORDER — SODIUM CHLORIDE 9 MG/ML
INJECTION, SOLUTION INTRAVENOUS CONTINUOUS
Status: DISCONTINUED | OUTPATIENT
Start: 2023-02-24 | End: 2023-02-24 | Stop reason: HOSPADM

## 2023-02-24 RX ORDER — LIDOCAINE HYDROCHLORIDE 20 MG/ML
INJECTION, SOLUTION EPIDURAL; INFILTRATION; INTRACAUDAL; PERINEURAL PRN
Status: DISCONTINUED | OUTPATIENT
Start: 2023-02-24 | End: 2023-02-24 | Stop reason: SDUPTHER

## 2023-02-24 RX ORDER — KETAMINE HCL IN NACL, ISO-OSM 100MG/10ML
SYRINGE (ML) INJECTION PRN
Status: DISCONTINUED | OUTPATIENT
Start: 2023-02-24 | End: 2023-02-24 | Stop reason: SDUPTHER

## 2023-02-24 RX ADMIN — SODIUM CHLORIDE: 9 INJECTION, SOLUTION INTRAVENOUS at 10:14

## 2023-02-24 RX ADMIN — PROPOFOL 100 MG: 10 INJECTION, EMULSION INTRAVENOUS at 10:19

## 2023-02-24 RX ADMIN — LIDOCAINE HYDROCHLORIDE 100 MG: 20 INJECTION, SOLUTION EPIDURAL; INFILTRATION; INTRACAUDAL; PERINEURAL at 10:19

## 2023-02-24 RX ADMIN — Medication 20 MG: at 10:19

## 2023-02-24 RX ADMIN — Medication 30 MG: at 10:22

## 2023-02-24 RX ADMIN — PROPOFOL 100 MCG/KG/MIN: 10 INJECTION, EMULSION INTRAVENOUS at 10:20

## 2023-02-24 ASSESSMENT — PAIN - FUNCTIONAL ASSESSMENT: PAIN_FUNCTIONAL_ASSESSMENT: NONE - DENIES PAIN

## 2023-02-24 NOTE — PROGRESS NOTES
Dr Petar Jones at bedside to speak with pt. Pt able to dress and ambulate with assist.   Pt discharged via wheel chair  in stable condition to  to be transported home.

## 2023-02-24 NOTE — H&P
Department of [facility] [address] Physicians   [employer] [facility] [employer]  Attending Pre-operative History and Physical      DIAGNOSIS:  Obesity    INDICATION:  Pre-op    PROCEDURE:  EGD    CHIEF COMPLAINT:  Obesity    History Obtained From:  patient    HISTORY OF PRESENT ILLNESS:    The patient is a 50 y.o. female with significant past medical history of   Patient Active Problem List   Diagnosis    Menorrhagia with regular cycle    Fibroid uterus    Adnexal mass    Chronic pelvic pain in female    Morbid obesity with BMI of 40.0-44.9, adult (HCC)    Sleep apnea    Mild episode of recurrent major depressive disorder (HCC)    Obstructive sleep apnea, adult    HTN (hypertension)    Dyslipidemia (high LDL; low HDL)      who presents for pre-op EGD    Past Medical History:        Diagnosis Date    Cancer ([facility] [address]     skin    Fibroid uterus     Morbid obesity with BMI of 40.0-44.9, adult (HCC)     Obsessive compulsive disorder     Obstructive sleep apnea, adult     Pre-operative clearance     Sleep apnea      Past Surgical History:        Procedure Laterality Date    FRACTURE SURGERY      right ankle and toe    HYSTERECTOMY, TOTAL ABDOMINAL (CERVIX REMOVED)  09/09/2016    Robotic total hysterectomy, L salpingectomy & R salpingectomy-oopherectomy    SKIN CANCER EXCISION       Medications Prior to Admission:   Medications Prior to Admission: amLODIPine (NORVASC) 5 MG tablet, Take 1 tablet by mouth daily  buPROPion (WELLBUTRIN SR) 150 MG extended release tablet, Take 1 tablet by mouth 2 times daily    Allergies:  Morphine and Keflex [cephalexin]    Social History:   TOBACCO:   reports that she has never smoked. She has never used smokeless tobacco.  ETOH:   reports that she does not currently use alcohol.   Family History:       Problem Relation Age of Onset    Elevated Lipids Mother     Hypertension Mother     Diabetes Mother     High Blood Pressure Mother     Cancer Mother         breast Breast Cancer Mother     Sleep Apnea Father     Kidney Disease Father     Hypertension Father     Diabetes Father     Diabetes Maternal Grandfather     Elevated Lipids Maternal Grandfather     Hypertension Maternal Grandfather     Coronary Art Dis Maternal Grandfather     Colon Cancer Paternal Grandmother     Depression Paternal Grandfather     Diabetes Paternal Grandfather     Hypertension Paternal Grandfather     Breast Cancer Maternal Cousin          REVIEW OF SYSTEMS:    Review of Systems - History obtained from the patient  General ROS: negative  Psychological ROS: negative  Ophthalmic ROS: negative  Neurological ROS: negative  ENT ROS: negative  Allergy and Immunology ROS: negative  Hematological and Lymphatic ROS: negative  Endocrine ROS: negative  Breast ROS: negative  Respiratory ROS: negative  Cardiovascular ROS: negative  Gastrointestinal ROS:negative  Genito-Urinary ROS: negative  Musculoskeletal ROS: negative   Skin ROS: negative      PHYSICAL EXAM:      /83   Pulse 73   Temp 98.2 °F (36.8 °C) (Temporal)   Resp 15   Ht 5' 8\" (1.727 m)   Wt 263 lb 9.6 oz (119.6 kg)   LMP 08/20/2016   SpO2 99%   BMI 40.08 kg/m²  I      Physical Exam   Vitals Reviewed   Constitutional: Patient is oriented to person, place, and time. Patient appears well-developed and well-nourished. Patient is active and cooperative. Non-toxic appearance. No distress. HENT:   Head: Normocephalic and atraumatic. Head is without abrasion and without laceration. Hair is normal.   Right Ear: External ear normal. No lacerations. No drainage, swelling . Left Ear: External ear normal. No lacerations. No drainage, swelling. Nose: Nose normal. No nose lacerations or nasal deformity. Eyes: Conjunctivae, EOM and lids are normal. Right eye exhibits no discharge. No foreign body present in the right eye. Left eye exhibits no discharge. No foreign body present in the left eye. No scleral icterus.    Neck: Trachea normal and normal range of motion. No JVD present. Pulmonary/Chest: Effort normal. No accessory muscle usage or stridor. No apnea. No respiratory distress. Cardiovascular: Normal rate and no JVD. Abdominal: Normal appearance. Patient exhibits no distension. Musculoskeletal: Normal range of motion. Patient exhibits no edema. Neurological: Patient is alert and oriented to person, place, and time. Patient has normal strength. GCS eye subscore is 4. GCS verbal subscore is 5. GCS motor subscore is 6. Skin: Skin is warm and dry. No abrasion and no rash noted. Patient is not diaphoretic. No cyanosis or erythema. Psychiatric: Patient has a normal mood and affect. Speech is normal and behavior is normal. Cognition and memory are normal.       DATA:  CBC:   Lab Results   Component Value Date/Time    WBC 6.6 02/13/2023 02:55 PM    RBC 4.54 02/13/2023 02:55 PM    HGB 14.0 02/13/2023 02:55 PM    HCT 40.3 02/13/2023 02:55 PM    MCV 88.7 02/13/2023 02:55 PM    MCH 30.9 02/13/2023 02:55 PM    MCHC 34.8 02/13/2023 02:55 PM    RDW 13.3 02/13/2023 02:55 PM     02/13/2023 02:55 PM    MPV 8.6 02/13/2023 02:55 PM     CMP:    Lab Results   Component Value Date/Time     02/13/2023 02:55 PM    K 4.4 02/13/2023 02:55 PM     02/13/2023 02:55 PM    CO2 26 02/13/2023 02:55 PM    BUN 14 02/13/2023 02:55 PM    CREATININE 0.8 02/13/2023 02:55 PM    GFRAA >60 06/23/2022 02:26 PM    AGRATIO 1.9 02/13/2023 02:55 PM    LABGLOM >60 02/13/2023 02:55 PM    GLUCOSE 88 02/13/2023 02:55 PM    PROT 7.0 02/13/2023 02:55 PM    LABALBU 4.6 02/13/2023 02:55 PM    CALCIUM 10.0 02/13/2023 02:55 PM    BILITOT 0.6 02/13/2023 02:55 PM    ALKPHOS 64 02/13/2023 02:55 PM    AST 19 02/13/2023 02:55 PM    ALT 44 02/13/2023 02:55 PM       ASSESSMENT AND PLAN:      Obesity: Body mass index is 40.08 kg/m². [x] Continue to make dietary and lifestyle modifications. [x] Plan for Future laparoscopic sleeve gastrectomy.   [x] Return for follow-up.      Chronic GERD:   [x] Continue to make dietary and lifestyle modifications.  [] Continue Omeprazole.  [] Continue Famotidine.  [x] Plan for EGD to evaluate the stomach.    I did explain thoroughly to the patient that compliance with pre- and post op diet and other recommendations are integral part to improve the chances of successful weight loss and also not following it could end with serious health complications.   Some strategies discussed today include but not limited to : 30/30/30 minutes rule, food diary, avoid fast food and packing/planing ahead, & increasing exercise.        1.  Patient is a 47 y.o. female with above specified procedure planned EGD with deep sedation  2.  Procedure options, risks and benefits reviewed with patient.  Patient expresses understanding.      Electronically signed by Jason May MD , FACS, [facility]  on 2/24/2023 at 10:14 AM

## 2023-02-24 NOTE — PROGRESS NOTES
Discharge instructions reviewed and understanding verbalized per pt  TJ face to face. Script sent to pt preferred pharmacy.

## 2023-02-24 NOTE — PROGRESS NOTES
Pt arrived from endo, report from crna/rn. Pt had EGD with biopsy. Abdomen soft and rounded. Pt awakening upon arrival, able to follow commands. Respirations even unlabored, adequate on room air.

## 2023-02-24 NOTE — DISCHARGE INSTRUCTIONS
ENDOSCOPY DISCHARGE INSTRUCTIONS    You may experience some lightheadedness for the next several hours. Plan on quiet relaxation for the rest of today. A responsible adult needs to stay with you today. Because of the medications you received today-do not drive,operate machinery,or sign any contractual agreement for the next 24 hours. Do not drink any alcoholic beverages or take any unprescribed medications tonight. Eat bland food and avoid anything greasy or spicy initially-progress to your normal diet gradually. Diet restrictions as instructed. You may resume home medications as instructed. It is not unusual to experience some mild cramping or gas pains, and you may not have a bowel movement for several days. If you have any of the following problems, notify your physician or return to the hospital emergency room : fever, chills, excessive bleeding, excessive vomiting, difficulty swallowing, uncontrolled pain, increased abdominal distention, shortness of breath or any other problems. If you have a sore throat, you may use lozenges or salt water gargles. Please call Dr. Emilia Be office in 5 business days for biopsy results [phone].

## 2023-02-25 ASSESSMENT — ENCOUNTER SYMPTOMS
EYES NEGATIVE: 1
RESPIRATORY NEGATIVE: 1
ALLERGIC/IMMUNOLOGIC NEGATIVE: 1
GASTROINTESTINAL NEGATIVE: 1

## 2023-02-25 NOTE — PROGRESS NOTES
[facility]) Physicians   [facility]    3/22/2023    TELEHEALTH EVALUATION -- Audio/Visual    Subjective:      Patient ID: Abel Nj is a 46 y.o. female has requested an audio/video evaluation. HPI    Due to the COVID-19 restrictions on close contact interactions the patient's monthly presurgical visit was conducted via audio/video in [place] of a face to face visit. Patient has consented to have this visit conducted via audio/video and I am conducting it from the office. The patient is here through telemedicine for their bariatric surgery presurgical visit for future weight loss. She has made several attempts at weight loss in the past without success and now wishes to pursue bariatric surgery. She is working to change her dietary behaviors and lose weight to improve comorbid conditions such as hypertension, hyperlipidemia, obstructive sleep apnea and GERD. Abel Nj is a 46 y.o. female with Body mass index is 41.29 kg/m².     Past Medical History:   Diagnosis Date    Cancer ([address]     skin    Fibroid uterus     Morbid obesity with BMI of 40.0-44.9, adult (HCC)     Obsessive compulsive disorder     Obstructive sleep apnea, adult     Pre-operative clearance     Sleep apnea      Past Surgical History:   Procedure Laterality Date    FRACTURE SURGERY      right ankle and toe    HYSTERECTOMY, TOTAL ABDOMINAL (CERVIX REMOVED)  09/09/2016    Robotic total hysterectomy, L salpingectomy & R salpingectomy-oopherectomy    SKIN CANCER EXCISION      UPPER GASTROINTESTINAL ENDOSCOPY N/A 2/24/2023    EGD BIOPSY performed by Johnathon Pina MD at [address] History   Problem Relation Age of Onset    Elevated Lipids Mother     Hypertension Mother     Diabetes Mother     High Blood Pressure Mother     Cancer Mother         breast    Breast Cancer Mother     Sleep Apnea Father     Kidney Disease Father     Hypertension Father     Diabetes Father     Diabetes Maternal Grandfather

## 2023-02-25 NOTE — PATIENT INSTRUCTIONS
Goals in preparing for bariatric surgery  You should be giving up all beverages that have carbonation, sugar, and caffeine (Refer to the approved liquids list provided at initial visit). You should be drinking 64 ounces of low calorie (5 calories or less per serving) fluids per day. Suggestions include:  Water (you may add fresh lemon or lime)  Crystal Light  Nelson Liquid Water Enhancer  Propel Zero  Powerade Zero/Gatorade Zero  Isopure  [id number]  SOBE Lifewater Zero  Vitamin Water Zero  Sugar Free Gary-Aid  You should be eating 4-6 times per day. Three small meals plus 1-2 snacks per day is your goal. This balances your calories and nutrients evenly throughout the day and helps to boost your metabolism. Refer to the snack list provided at your initial visit. Aim for a protein at every snack, plus a fruit, vegetable or starch. You should be eating protein at every meal and snack. Protein is typically found in animal sources, i.e. chicken, lean beef, lean pork, fish, seafood and eggs. It is also found in low-fat dairy sources such as skim or 1% milk, low-fat yogurt, low-fat cheese, and low-fat cottage cheese. Plant based sources of protein include peanut butter, beans, and soy. You should be utilizing the 9-inch plate method. Eating on a smaller plate will help you control portion size, but what you put on your plate counts also. Make ¼ of your plate lean protein, ¼ carbohydrate (fruit, grain or starchy vegetables) and ½ the plate non-starchy vegetables. You should eliminate caffeine. Caffeine is dehydrating. After surgery, it's very important to stay hydrated. Giving up caffeine before surgery will help you focus on the changes necessary to be successful after surgery. There are many decaffeinated coffee and tea products available in grocery stores. You should eliminate alcohol. You must abstain from alcohol prior to surgery and for at least the first 6-9 months after surgery.  Although you may have alcohol

## 2023-03-22 ENCOUNTER — TELEMEDICINE (OUTPATIENT)
Dept: BARIATRICS/WEIGHT MGMT | Age: 48
End: 2023-03-22
Payer: COMMERCIAL

## 2023-03-22 VITALS — WEIGHT: 263.6 LBS | BODY MASS INDEX: 41.37 KG/M2 | HEIGHT: 67 IN

## 2023-03-22 DIAGNOSIS — E66.01 MORBID OBESITY WITH BMI OF 40.0-44.9, ADULT (HCC): Chronic | ICD-10-CM

## 2023-03-22 DIAGNOSIS — E78.5 DYSLIPIDEMIA (HIGH LDL; LOW HDL): ICD-10-CM

## 2023-03-22 DIAGNOSIS — K21.9 CHRONIC GERD: Primary | ICD-10-CM

## 2023-03-22 DIAGNOSIS — I10 PRIMARY HYPERTENSION: Chronic | ICD-10-CM

## 2023-03-22 DIAGNOSIS — G47.33 OBSTRUCTIVE SLEEP APNEA, ADULT: ICD-10-CM

## 2023-03-22 PROCEDURE — 99214 OFFICE O/P EST MOD 30 MIN: CPT | Performed by: NURSE PRACTITIONER

## 2023-03-22 NOTE — PROGRESS NOTES
Bhavya Turk gained 1.6 lbs over past month. Is pt eating at least 4 times everyday? Reports overall she is     Is pt eating a lean protein source with all meals and snacks? For meals but not for snacks   B - protein shake   S - fruit   L - lean cuisine   S - fruit   D - Salad with grilled chicken - trying to use less dressing   *Reports she eats out a lot for dinner as it is just her  and her     Has pt decreased their portions using the plate method? Reports she is     Is pt choosing low fat/sugar free options? Reports she tries to but had 48th birthday recently and everyone wanted to celebrate her birthday with food but feels she is back on track     Is pt drinking at least 64 oz of clear liquids everyday? Yes     Has pt stopped drinking carbonation, caffeinated, and sugar sweetened beverages? No -  Drinking water, vitamin water zero, diet soda (reports has been decreasing), unsweetened tea with sweet n low (caffeinated)    Has pt sampled Unjury and/or Nectar protein? Tried and tolerated    Has pt attended a support group? Scheduled for 3/27 SG     Participating in intentional exercise?  Walking 3 x week     Plan/Recommendations:   Avoid soda / switch to decaf tea   Eat 4 x day consistently   Include protein with all snacks   Attend SG     Handouts: none    Emily August, RD, LD

## 2023-03-23 ENCOUNTER — TELEPHONE (OUTPATIENT)
Dept: PULMONOLOGY | Age: 48
End: 2023-03-23

## 2023-03-23 NOTE — TELEPHONE ENCOUNTER
Spoke with pt to offer to send her order elsewhere however, she stated she would prefer to stay with [address] [place] [address] since the order has been started.

## 2023-03-28 ENCOUNTER — INITIAL CONSULT (OUTPATIENT)
Dept: BARIATRICS/WEIGHT MGMT | Age: 48
End: 2023-03-28

## 2023-03-28 DIAGNOSIS — E66.01 MORBID OBESITY WITH BMI OF 40.0-44.9, ADULT (HCC): ICD-10-CM

## 2023-03-28 DIAGNOSIS — F41.9 ANXIETY: Primary | ICD-10-CM

## 2023-03-28 DIAGNOSIS — F33.41 MDD (MAJOR DEPRESSIVE DISORDER), RECURRENT, IN PARTIAL REMISSION (HCC): ICD-10-CM

## 2023-03-28 NOTE — PROGRESS NOTES
skipping regular meals, but states she is currently eating 4-5 small meals and/or snacks consistently throughout her day. She reports doing more meal planning, and is packing healthy snacks to eat at work throughout the day. She is actively weaning herself from caffeine and carbonated beverages, citing her current use as one can of Diet Coke per day, and one cup or coffee or tea per day. She reports drinking an adequate daily intake of water. She endorsed self-punitive thoughts and feelings related to her weight and/or eating behaviors. She denies binge eating or purging behavior. 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 She was previously employed in the same position with [employer] for 25 years. She currently resides with her  of 13 years and her 26 y/o daughter. She maintains regular contact with her four older children, ages 25-32 years old. Diana Vasquez presents as nicely dressed and neatly groomed. She ambulated with no visible gait disturbance and without assistive devices. She easily established rapport, and was open in her responses. Affect was mildly anxious but appropriate to content. Mood was reported as stable and free from significant depressive symptoms. Speech was articulate and within normal limits for rate and volume. There were no obvious cognitive deficits, nor gross impairment in attention or memory. She was oriented to person, place, and time. Thought processes were logical and coherent with no signs of psychosis. Insight and judgment were estimated to be good. Diana Vasquez completed the MBMD as part of the evaluation. Her profile is valid. Results indicate inactivity is a possible problem area at this time. There is no indication of acute psychiatric distress, including anxiety, depression, emotional lability, or cognitive dysfunction.  Her

## 2023-04-05 ASSESSMENT — ENCOUNTER SYMPTOMS
GASTROINTESTINAL NEGATIVE: 1
COUGH: 0
SHORTNESS OF BREATH: 0
EYES NEGATIVE: 1
RESPIRATORY NEGATIVE: 1
ALLERGIC/IMMUNOLOGIC NEGATIVE: 1

## 2023-04-24 ENCOUNTER — OFFICE VISIT (OUTPATIENT)
Dept: BARIATRICS/WEIGHT MGMT | Age: 48
End: 2023-04-24
Payer: COMMERCIAL

## 2023-04-24 VITALS
BODY MASS INDEX: 42.56 KG/M2 | HEART RATE: 75 BPM | OXYGEN SATURATION: 98 % | SYSTOLIC BLOOD PRESSURE: 138 MMHG | DIASTOLIC BLOOD PRESSURE: 88 MMHG | WEIGHT: 271.2 LBS | TEMPERATURE: 98.1 F | HEIGHT: 67 IN

## 2023-04-24 DIAGNOSIS — G47.33 OBSTRUCTIVE SLEEP APNEA, ADULT: ICD-10-CM

## 2023-04-24 DIAGNOSIS — K21.9 CHRONIC GERD: Primary | ICD-10-CM

## 2023-04-24 DIAGNOSIS — E66.01 MORBID OBESITY WITH BMI OF 40.0-44.9, ADULT (HCC): Chronic | ICD-10-CM

## 2023-04-24 DIAGNOSIS — I10 PRIMARY HYPERTENSION: Chronic | ICD-10-CM

## 2023-04-24 DIAGNOSIS — E78.5 DYSLIPIDEMIA (HIGH LDL; LOW HDL): ICD-10-CM

## 2023-04-24 PROCEDURE — 3079F DIAST BP 80-89 MM HG: CPT | Performed by: NURSE PRACTITIONER

## 2023-04-24 PROCEDURE — 99214 OFFICE O/P EST MOD 30 MIN: CPT | Performed by: NURSE PRACTITIONER

## 2023-04-24 PROCEDURE — 3075F SYST BP GE 130 - 139MM HG: CPT | Performed by: NURSE PRACTITIONER

## 2023-04-24 NOTE — PROGRESS NOTES
Matty Rosas gained 7.6 lbs over past month - pt not sure of what is attributing to weight gain. Reports it may be that now she is working through dinner at work and her  is preparing her meals and she states he doesn't cook healthy. Reports she is aware of what foods she should be consuming, just maybe hasn't been watching this as closely lately. Is pt eating at least 4 times everyday? Yes     Is pt eating a lean protein source with all meals and snacks? Generally   B - protein shake   L - handful of nuts   S - apple with can of soup (veggie chili)  OR protein shake   D - *still at work now [employer] (pasta with ground beef/sausage) OR chicken pot pie     Has pt decreased their portions using the plate method? Reports portions are reduced     Is pt choosing low fat/sugar free options? Not always lean     Is pt drinking at least 64 oz of clear liquids everyday? Yes     Has pt stopped drinking carbonation, caffeinated, and sugar sweetened beverages? No - Drinking water, vitamin water zero, eliminated diet soda, occasional unsweetened tea with sweet n low (caffeinated), coffee (caffeinated)     Has pt sampled Unjury and/or Nectar protein? Tried and tolerated    Has pt attended a support group? Completed 3/27    Participating in intentional exercise? Not in the last 6 weeks as she took a new job working 14 hours in a day. Plan/Recommendations:   Switch to decaf coffee/tea  Avoid some high calorie foods her  is preparing - chicken pot pie. Discussed addressing cooking methods / utilizing frozen meals / preparing her own foods.      Handouts: none     Ingrid Perry, RD, LD

## 2023-05-23 ENCOUNTER — OFFICE VISIT (OUTPATIENT)
Dept: BARIATRICS/WEIGHT MGMT | Age: 48
End: 2023-05-23
Payer: COMMERCIAL

## 2023-05-23 VITALS
BODY MASS INDEX: 42.12 KG/M2 | WEIGHT: 268.4 LBS | HEART RATE: 80 BPM | HEIGHT: 67 IN | SYSTOLIC BLOOD PRESSURE: 128 MMHG | DIASTOLIC BLOOD PRESSURE: 78 MMHG | OXYGEN SATURATION: 98 % | RESPIRATION RATE: 18 BRPM

## 2023-05-23 DIAGNOSIS — E66.01 MORBID OBESITY WITH BMI OF 40.0-44.9, ADULT (HCC): Primary | Chronic | ICD-10-CM

## 2023-05-23 DIAGNOSIS — G47.33 OBSTRUCTIVE SLEEP APNEA, ADULT: ICD-10-CM

## 2023-05-23 DIAGNOSIS — K21.9 CHRONIC GERD: ICD-10-CM

## 2023-05-23 DIAGNOSIS — E78.5 DYSLIPIDEMIA (HIGH LDL; LOW HDL): ICD-10-CM

## 2023-05-23 DIAGNOSIS — I10 PRIMARY HYPERTENSION: Chronic | ICD-10-CM

## 2023-05-23 PROCEDURE — 3074F SYST BP LT 130 MM HG: CPT | Performed by: SURGERY

## 2023-05-23 PROCEDURE — 3078F DIAST BP <80 MM HG: CPT | Performed by: SURGERY

## 2023-05-23 PROCEDURE — 99214 OFFICE O/P EST MOD 30 MIN: CPT | Performed by: SURGERY

## 2023-05-23 ASSESSMENT — ENCOUNTER SYMPTOMS
GASTROINTESTINAL NEGATIVE: 1
EYES NEGATIVE: 1
ALLERGIC/IMMUNOLOGIC NEGATIVE: 1
RESPIRATORY NEGATIVE: 1
SHORTNESS OF BREATH: 0
COUGH: 0

## 2023-05-23 NOTE — PROGRESS NOTES
Bhavya Sellers lost 2.8 lbs over past month. Pt has been meal planning more since her last visit. Is pt eating at least 4 times everyday? Yes     Is pt eating a lean protein source with all meals and snacks? Not always   B - oatmeal made with water OR protein shake   S - fruit (apple/orange/strawberries)   L - leftovers OR smartones frozen meal OR salad with HB eggs   D- chicken stir umanzor OR grilled ame with / [place] / swiss cheese (no bread) with broccoli/asparagus     Has pt decreased their portions using the plate method? Improving     Is pt choosing low fat/sugar free options? Yes overall     Is pt drinking at least 64 oz of clear liquids everyday? Yes     Has pt stopped drinking carbonation, caffeinated, and sugar sweetened beverages? Yes - Drinking water, vitamin water zero, crystal light, eliminated caffeinated coffee/tea      Has pt sampled Unjury and/or Nectar protein? Yes, tried and tolerated    Has pt attended a support group? Yes, completed 3/27    Participating in intentional exercise?  Pt has been doing a lot of physical labor outside - painting, etc.     Plan/Recommendations:   Include protein with breakfast and snack    Handouts: none     Terry Powers RD, LD
patient's BMI, weight change, and percent total weight change after surgery. These quantities are estimated based upon information the patient gives the healthcare provider about prior health history. The estimates are calculated using data from a large number of patients who had a primary bariatric surgical procedure similar to the one the patient may have. Please note the risk percentages, remission percentages, BMI, weight change, and percent total weight change provided to you by the risk/benefit calculator are only estimates. These estimates only take certain information into account. There may be other factors that are not included in the estimate which may increase or decrease the risk of a complication, the chance of remission of a weight-related comorbidity, or the amount of weight the patient loses. These estimates are not a guarantee of results. A complication after surgery may happen even if the risk is low, a weight-related comorbidity may not go into remission even if the chances are high, and a patient may lose more or less weight than predicted. This information is not intended to replace the advice of a doctor or healthcare provider about the diagnosis, treatment, or potential outcomes. The Provider is not responsible for medical decisions that may be made by the patient based on the risk/benefit calculator estimates, since these estimates are provided for informational purposes. Patients should always consult their doctor or other health care provider before deciding on a treatment plan. Both open and laparoscopic approach were explained in details. Benefits and risks explained. Informed consent obtained. Risks including but not limited to; Infection, bleeding, gastric leak or obstruction, persistent nausea, vomiting, or reflux, injury to surrounding structures, risks of anesthesia, stricture, delayed gastric emptying, staple line leak, incisional hernia, malnutrition, vitamin deficiency,

## 2023-05-24 NOTE — PROGRESS NOTES
Patient Name:   Gio Pugh        Type of Surgery:  Sleeve         Date of Surgery:  6/21/23        Start Pre-Op Diet On:  6/8/23        Start Clear Liquids On:  6/20/23          NPO after midnight the night before your surgery! Take morning medications with a small amount of water.     NOTES:_______________________________________  ______________________________________________

## 2023-05-30 ENCOUNTER — OFFICE VISIT (OUTPATIENT)
Dept: BARIATRICS/WEIGHT MGMT | Age: 48
End: 2023-05-30
Payer: COMMERCIAL

## 2023-05-30 VITALS — BODY MASS INDEX: 42.85 KG/M2 | WEIGHT: 273 LBS | HEIGHT: 67 IN

## 2023-05-30 DIAGNOSIS — E66.01 MORBID OBESITY WITH BMI OF 40.0-44.9, ADULT (HCC): Chronic | ICD-10-CM

## 2023-05-30 DIAGNOSIS — I10 PRIMARY HYPERTENSION: Chronic | ICD-10-CM

## 2023-05-30 DIAGNOSIS — E78.5 DYSLIPIDEMIA (HIGH LDL; LOW HDL): ICD-10-CM

## 2023-05-30 DIAGNOSIS — K21.9 CHRONIC GERD: Primary | ICD-10-CM

## 2023-05-30 DIAGNOSIS — G47.33 OBSTRUCTIVE SLEEP APNEA, ADULT: ICD-10-CM

## 2023-05-30 PROCEDURE — 99214 OFFICE O/P EST MOD 30 MIN: CPT | Performed by: NURSE PRACTITIONER

## 2023-06-12 DIAGNOSIS — Z01.818 PREOP TESTING: ICD-10-CM

## 2023-06-12 LAB
ABO + RH BLD: NORMAL
ALBUMIN SERPL-MCNC: 4.6 G/DL (ref 3.4–5)
ALBUMIN/GLOB SERPL: 1.8 {RATIO} (ref 1.1–2.2)
ALP SERPL-CCNC: 64 U/L (ref 40–129)
ALT SERPL-CCNC: 48 U/L (ref 10–40)
ANION GAP SERPL CALCULATED.3IONS-SCNC: 11 MMOL/L (ref 3–16)
AST SERPL-CCNC: 25 U/L (ref 15–37)
BILIRUB SERPL-MCNC: 0.6 MG/DL (ref 0–1)
BLD GP AB SCN SERPL QL: NORMAL
BUN SERPL-MCNC: 16 MG/DL (ref 7–20)
CALCIUM SERPL-MCNC: 10 MG/DL (ref 8.3–10.6)
CHLORIDE SERPL-SCNC: 101 MMOL/L (ref 99–110)
CO2 SERPL-SCNC: 25 MMOL/L (ref 21–32)
CREAT SERPL-MCNC: 0.8 MG/DL (ref 0.6–1.1)
DEPRECATED RDW RBC AUTO: 12.8 % (ref 12.4–15.4)
GFR SERPLBLD CREATININE-BSD FMLA CKD-EPI: >60 ML/MIN/{1.73_M2}
GLUCOSE SERPL-MCNC: 81 MG/DL (ref 70–99)
HCT VFR BLD AUTO: 39.1 % (ref 36–48)
HGB BLD-MCNC: 13.5 G/DL (ref 12–16)
MCH RBC QN AUTO: 31.2 PG (ref 26–34)
MCHC RBC AUTO-ENTMCNC: 34.5 G/DL (ref 31–36)
MCV RBC AUTO: 90.3 FL (ref 80–100)
PLATELET # BLD AUTO: 237 K/UL (ref 135–450)
PMV BLD AUTO: 8.5 FL (ref 5–10.5)
POTASSIUM SERPL-SCNC: 4.6 MMOL/L (ref 3.5–5.1)
PROT SERPL-MCNC: 7.2 G/DL (ref 6.4–8.2)
RBC # BLD AUTO: 4.32 M/UL (ref 4–5.2)
SODIUM SERPL-SCNC: 137 MMOL/L (ref 136–145)
WBC # BLD AUTO: 8.1 K/UL (ref 4–11)

## 2023-06-21 ENCOUNTER — HOSPITAL ENCOUNTER (INPATIENT)
Age: 48
LOS: 1 days | Discharge: HOME | DRG: 621 | End: 2023-06-22
Attending: SURGERY | Admitting: SURGERY
Payer: COMMERCIAL

## 2023-06-21 ENCOUNTER — ANESTHESIA (OUTPATIENT)
Dept: OPERATING ROOM | Age: 48
DRG: 621 | End: 2023-06-21
Payer: COMMERCIAL

## 2023-06-21 ENCOUNTER — ANESTHESIA EVENT (OUTPATIENT)
Dept: OPERATING ROOM | Age: 48
DRG: 621 | End: 2023-06-21
Payer: COMMERCIAL

## 2023-06-21 DIAGNOSIS — E66.01 MORBID OBESITY (HCC): ICD-10-CM

## 2023-06-21 DIAGNOSIS — Z98.84 S/P LAPAROSCOPIC SLEEVE GASTRECTOMY: ICD-10-CM

## 2023-06-21 DIAGNOSIS — Z01.818 PREOP TESTING: Primary | ICD-10-CM

## 2023-06-21 LAB
ABO + RH BLD: NORMAL
BLD GP AB SCN SERPL QL: NORMAL

## 2023-06-21 PROCEDURE — 0DB64Z3 EXCISION OF STOMACH, PERCUTANEOUS ENDOSCOPIC APPROACH, VERTICAL: ICD-10-PCS | Performed by: SURGERY

## 2023-06-21 PROCEDURE — 3700000000 HC ANESTHESIA ATTENDED CARE: Performed by: SURGERY

## 2023-06-21 PROCEDURE — 88307 TISSUE EXAM BY PATHOLOGIST: CPT

## 2023-06-21 PROCEDURE — 2580000003 HC RX 258: Performed by: SURGERY

## 2023-06-21 PROCEDURE — 2709999900 HC NON-CHARGEABLE SUPPLY: Performed by: SURGERY

## 2023-06-21 PROCEDURE — 2720000010 HC SURG SUPPLY STERILE: Performed by: SURGERY

## 2023-06-21 PROCEDURE — 3600000005 HC SURGERY LEVEL 5 BASE: Performed by: SURGERY

## 2023-06-21 PROCEDURE — 3700000001 HC ADD 15 MINUTES (ANESTHESIA): Performed by: SURGERY

## 2023-06-21 PROCEDURE — 6360000002 HC RX W HCPCS: Performed by: SURGERY

## 2023-06-21 PROCEDURE — 2500000003 HC RX 250 WO HCPCS: Performed by: SURGERY

## 2023-06-21 PROCEDURE — 86900 BLOOD TYPING SEROLOGIC ABO: CPT

## 2023-06-21 PROCEDURE — 6360000002 HC RX W HCPCS: Performed by: ANESTHESIOLOGY

## 2023-06-21 PROCEDURE — 2700000000 HC OXYGEN THERAPY PER DAY

## 2023-06-21 PROCEDURE — 7100000000 HC PACU RECOVERY - FIRST 15 MIN: Performed by: SURGERY

## 2023-06-21 PROCEDURE — A4217 STERILE WATER/SALINE, 500 ML: HCPCS | Performed by: SURGERY

## 2023-06-21 PROCEDURE — 3600000015 HC SURGERY LEVEL 5 ADDTL 15MIN: Performed by: SURGERY

## 2023-06-21 PROCEDURE — 36415 COLL VENOUS BLD VENIPUNCTURE: CPT

## 2023-06-21 PROCEDURE — 6370000000 HC RX 637 (ALT 250 FOR IP): Performed by: SURGERY

## 2023-06-21 PROCEDURE — 7100000001 HC PACU RECOVERY - ADDTL 15 MIN: Performed by: SURGERY

## 2023-06-21 PROCEDURE — 94150 VITAL CAPACITY TEST: CPT

## 2023-06-21 PROCEDURE — 86850 RBC ANTIBODY SCREEN: CPT

## 2023-06-21 PROCEDURE — 86901 BLOOD TYPING SEROLOGIC RH(D): CPT

## 2023-06-21 PROCEDURE — 6360000002 HC RX W HCPCS

## 2023-06-21 PROCEDURE — 1200000000 HC SEMI PRIVATE

## 2023-06-21 PROCEDURE — 94760 N-INVAS EAR/PLS OXIMETRY 1: CPT

## 2023-06-21 PROCEDURE — 2500000003 HC RX 250 WO HCPCS

## 2023-06-21 RX ORDER — HYDROMORPHONE HCL 110MG/55ML
0.25 PATIENT CONTROLLED ANALGESIA SYRINGE INTRAVENOUS EVERY 5 MIN PRN
Status: DISCONTINUED | OUTPATIENT
Start: 2023-06-21 | End: 2023-06-21 | Stop reason: HOSPADM

## 2023-06-21 RX ORDER — MAGNESIUM SULFATE HEPTAHYDRATE 500 MG/ML
INJECTION, SOLUTION INTRAMUSCULAR; INTRAVENOUS PRN
Status: DISCONTINUED | OUTPATIENT
Start: 2023-06-21 | End: 2023-06-21 | Stop reason: SDUPTHER

## 2023-06-21 RX ORDER — DIPHENHYDRAMINE HYDROCHLORIDE 50 MG/ML
12.5 INJECTION INTRAMUSCULAR; INTRAVENOUS EVERY 6 HOURS PRN
Status: DISCONTINUED | OUTPATIENT
Start: 2023-06-21 | End: 2023-06-22 | Stop reason: HOSPADM

## 2023-06-21 RX ORDER — PHENYLEPHRINE HCL IN 0.9% NACL 1 MG/10 ML
SYRINGE (ML) INTRAVENOUS PRN
Status: DISCONTINUED | OUTPATIENT
Start: 2023-06-21 | End: 2023-06-21 | Stop reason: SDUPTHER

## 2023-06-21 RX ORDER — LEVOFLOXACIN 5 MG/ML
500 INJECTION, SOLUTION INTRAVENOUS ONCE
Status: COMPLETED | OUTPATIENT
Start: 2023-06-21 | End: 2023-06-21

## 2023-06-21 RX ORDER — LIDOCAINE 4 G/G
1 PATCH TOPICAL DAILY
Status: DISCONTINUED | OUTPATIENT
Start: 2023-06-22 | End: 2023-06-22 | Stop reason: HOSPADM

## 2023-06-21 RX ORDER — LIDOCAINE HYDROCHLORIDE 10 MG/ML
0.5 INJECTION, SOLUTION EPIDURAL; INFILTRATION; INTRACAUDAL; PERINEURAL ONCE
Status: DISCONTINUED | OUTPATIENT
Start: 2023-06-21 | End: 2023-06-21 | Stop reason: HOSPADM

## 2023-06-21 RX ORDER — CIPROFLOXACIN 2 MG/ML
400 INJECTION, SOLUTION INTRAVENOUS
Status: DISCONTINUED | OUTPATIENT
Start: 2023-06-21 | End: 2023-06-21

## 2023-06-21 RX ORDER — SODIUM CHLORIDE 9 MG/ML
INJECTION, SOLUTION INTRAVENOUS PRN
Status: DISCONTINUED | OUTPATIENT
Start: 2023-06-21 | End: 2023-06-22 | Stop reason: HOSPADM

## 2023-06-21 RX ORDER — METHOCARBAMOL 100 MG/ML
INJECTION, SOLUTION INTRAMUSCULAR; INTRAVENOUS PRN
Status: DISCONTINUED | OUTPATIENT
Start: 2023-06-21 | End: 2023-06-21 | Stop reason: SDUPTHER

## 2023-06-21 RX ORDER — ONDANSETRON 2 MG/ML
4 INJECTION INTRAMUSCULAR; INTRAVENOUS EVERY 6 HOURS PRN
Status: DISCONTINUED | OUTPATIENT
Start: 2023-06-21 | End: 2023-06-22 | Stop reason: HOSPADM

## 2023-06-21 RX ORDER — DEXAMETHASONE SODIUM PHOSPHATE 4 MG/ML
INJECTION, SOLUTION INTRA-ARTICULAR; INTRALESIONAL; INTRAMUSCULAR; INTRAVENOUS; SOFT TISSUE PRN
Status: DISCONTINUED | OUTPATIENT
Start: 2023-06-21 | End: 2023-06-21 | Stop reason: SDUPTHER

## 2023-06-21 RX ORDER — LABETALOL HYDROCHLORIDE 5 MG/ML
10 INJECTION, SOLUTION INTRAVENOUS
Status: DISCONTINUED | OUTPATIENT
Start: 2023-06-21 | End: 2023-06-22 | Stop reason: HOSPADM

## 2023-06-21 RX ORDER — METOCLOPRAMIDE HYDROCHLORIDE 5 MG/ML
10 INJECTION INTRAMUSCULAR; INTRAVENOUS EVERY 6 HOURS PRN
Status: DISCONTINUED | OUTPATIENT
Start: 2023-06-21 | End: 2023-06-22 | Stop reason: HOSPADM

## 2023-06-21 RX ORDER — MIDAZOLAM HYDROCHLORIDE 1 MG/ML
INJECTION INTRAMUSCULAR; INTRAVENOUS PRN
Status: DISCONTINUED | OUTPATIENT
Start: 2023-06-21 | End: 2023-06-21 | Stop reason: SDUPTHER

## 2023-06-21 RX ORDER — SCOLOPAMINE TRANSDERMAL SYSTEM 1 MG/1
1 PATCH, EXTENDED RELEASE TRANSDERMAL
Status: DISCONTINUED | OUTPATIENT
Start: 2023-06-24 | End: 2023-06-22 | Stop reason: HOSPADM

## 2023-06-21 RX ORDER — HYOSCYAMINE SULFATE 0.5 MG/ML
250 INJECTION, SOLUTION SUBCUTANEOUS EVERY 4 HOURS PRN
Status: DISCONTINUED | OUTPATIENT
Start: 2023-06-21 | End: 2023-06-22 | Stop reason: HOSPADM

## 2023-06-21 RX ORDER — SODIUM CHLORIDE 0.9 % (FLUSH) 0.9 %
5-40 SYRINGE (ML) INJECTION EVERY 12 HOURS SCHEDULED
Status: DISCONTINUED | OUTPATIENT
Start: 2023-06-21 | End: 2023-06-21 | Stop reason: HOSPADM

## 2023-06-21 RX ORDER — DIPHENHYDRAMINE HYDROCHLORIDE 50 MG/ML
12.5 INJECTION INTRAMUSCULAR; INTRAVENOUS
Status: DISCONTINUED | OUTPATIENT
Start: 2023-06-21 | End: 2023-06-21 | Stop reason: HOSPADM

## 2023-06-21 RX ORDER — SODIUM CHLORIDE 9 MG/ML
INJECTION, SOLUTION INTRAVENOUS PRN
Status: DISCONTINUED | OUTPATIENT
Start: 2023-06-21 | End: 2023-06-21 | Stop reason: HOSPADM

## 2023-06-21 RX ORDER — SODIUM CHLORIDE 0.9 % (FLUSH) 0.9 %
5-40 SYRINGE (ML) INJECTION EVERY 12 HOURS SCHEDULED
Status: DISCONTINUED | OUTPATIENT
Start: 2023-06-21 | End: 2023-06-22 | Stop reason: HOSPADM

## 2023-06-21 RX ORDER — MEPERIDINE HYDROCHLORIDE 25 MG/ML
12.5 INJECTION INTRAMUSCULAR; INTRAVENOUS; SUBCUTANEOUS EVERY 5 MIN PRN
Status: DISCONTINUED | OUTPATIENT
Start: 2023-06-21 | End: 2023-06-21 | Stop reason: HOSPADM

## 2023-06-21 RX ORDER — SCOLOPAMINE TRANSDERMAL SYSTEM 1 MG/1
1 PATCH, EXTENDED RELEASE TRANSDERMAL ONCE
Status: DISCONTINUED | OUTPATIENT
Start: 2023-06-21 | End: 2023-06-21

## 2023-06-21 RX ORDER — MAGNESIUM HYDROXIDE 1200 MG/15ML
LIQUID ORAL CONTINUOUS PRN
Status: COMPLETED | OUTPATIENT
Start: 2023-06-21 | End: 2023-06-21

## 2023-06-21 RX ORDER — ENOXAPARIN SODIUM 100 MG/ML
30 INJECTION SUBCUTANEOUS ONCE
Status: COMPLETED | OUTPATIENT
Start: 2023-06-21 | End: 2023-06-21

## 2023-06-21 RX ORDER — ACETAMINOPHEN 160 MG/5ML
650 SOLUTION ORAL ONCE
Status: COMPLETED | OUTPATIENT
Start: 2023-06-21 | End: 2023-06-21

## 2023-06-21 RX ORDER — HYDROMORPHONE HCL 110MG/55ML
0.5 PATIENT CONTROLLED ANALGESIA SYRINGE INTRAVENOUS EVERY 5 MIN PRN
Status: COMPLETED | OUTPATIENT
Start: 2023-06-21 | End: 2023-06-21

## 2023-06-21 RX ORDER — ONDANSETRON 2 MG/ML
INJECTION INTRAMUSCULAR; INTRAVENOUS PRN
Status: DISCONTINUED | OUTPATIENT
Start: 2023-06-21 | End: 2023-06-21 | Stop reason: SDUPTHER

## 2023-06-21 RX ORDER — LEVOFLOXACIN 5 MG/ML
INJECTION, SOLUTION INTRAVENOUS
Status: COMPLETED
Start: 2023-06-21 | End: 2023-06-21

## 2023-06-21 RX ORDER — PROMETHAZINE HYDROCHLORIDE 25 MG/ML
6.25 INJECTION, SOLUTION INTRAMUSCULAR; INTRAVENOUS EVERY 6 HOURS PRN
Status: DISCONTINUED | OUTPATIENT
Start: 2023-06-21 | End: 2023-06-22 | Stop reason: HOSPADM

## 2023-06-21 RX ORDER — METHYLENE BLUE 10 MG/ML
INJECTION INTRAVENOUS
Status: COMPLETED | OUTPATIENT
Start: 2023-06-21 | End: 2023-06-21

## 2023-06-21 RX ORDER — SODIUM CHLORIDE 0.9 % (FLUSH) 0.9 %
5-40 SYRINGE (ML) INJECTION PRN
Status: DISCONTINUED | OUTPATIENT
Start: 2023-06-21 | End: 2023-06-21 | Stop reason: HOSPADM

## 2023-06-21 RX ORDER — BUPIVACAINE HYDROCHLORIDE AND EPINEPHRINE 5; 5 MG/ML; UG/ML
INJECTION, SOLUTION PERINEURAL
Status: COMPLETED | OUTPATIENT
Start: 2023-06-21 | End: 2023-06-21

## 2023-06-21 RX ORDER — LIDOCAINE HYDROCHLORIDE 20 MG/ML
INJECTION, SOLUTION EPIDURAL; INFILTRATION; INTRACAUDAL; PERINEURAL PRN
Status: DISCONTINUED | OUTPATIENT
Start: 2023-06-21 | End: 2023-06-21 | Stop reason: SDUPTHER

## 2023-06-21 RX ORDER — SUCCINYLCHOLINE/SOD CL,ISO/PF 200MG/10ML
SYRINGE (ML) INTRAVENOUS PRN
Status: DISCONTINUED | OUTPATIENT
Start: 2023-06-21 | End: 2023-06-21 | Stop reason: SDUPTHER

## 2023-06-21 RX ORDER — ROCURONIUM BROMIDE 10 MG/ML
INJECTION, SOLUTION INTRAVENOUS PRN
Status: DISCONTINUED | OUTPATIENT
Start: 2023-06-21 | End: 2023-06-21 | Stop reason: SDUPTHER

## 2023-06-21 RX ORDER — HYDROMORPHONE HYDROCHLORIDE 1 MG/ML
0.5 INJECTION, SOLUTION INTRAMUSCULAR; INTRAVENOUS; SUBCUTANEOUS
Status: DISCONTINUED | OUTPATIENT
Start: 2023-06-21 | End: 2023-06-22 | Stop reason: HOSPADM

## 2023-06-21 RX ORDER — FENTANYL CITRATE 50 UG/ML
INJECTION, SOLUTION INTRAMUSCULAR; INTRAVENOUS PRN
Status: DISCONTINUED | OUTPATIENT
Start: 2023-06-21 | End: 2023-06-21 | Stop reason: SDUPTHER

## 2023-06-21 RX ORDER — NALOXONE HYDROCHLORIDE 0.4 MG/ML
INJECTION, SOLUTION INTRAMUSCULAR; INTRAVENOUS; SUBCUTANEOUS PRN
Status: DISCONTINUED | OUTPATIENT
Start: 2023-06-21 | End: 2023-06-22

## 2023-06-21 RX ORDER — CLINDAMYCIN PHOSPHATE 900 MG/50ML
900 INJECTION INTRAVENOUS
Status: COMPLETED | OUTPATIENT
Start: 2023-06-21 | End: 2023-06-21

## 2023-06-21 RX ORDER — ONDANSETRON 2 MG/ML
4 INJECTION INTRAMUSCULAR; INTRAVENOUS
Status: DISCONTINUED | OUTPATIENT
Start: 2023-06-21 | End: 2023-06-21 | Stop reason: HOSPADM

## 2023-06-21 RX ORDER — KETAMINE HCL IN NACL, ISO-OSM 100MG/10ML
SYRINGE (ML) INJECTION PRN
Status: DISCONTINUED | OUTPATIENT
Start: 2023-06-21 | End: 2023-06-21 | Stop reason: SDUPTHER

## 2023-06-21 RX ORDER — HYDRALAZINE HYDROCHLORIDE 20 MG/ML
10 INJECTION INTRAMUSCULAR; INTRAVENOUS
Status: DISCONTINUED | OUTPATIENT
Start: 2023-06-21 | End: 2023-06-22 | Stop reason: HOSPADM

## 2023-06-21 RX ORDER — SODIUM CHLORIDE, SODIUM LACTATE, POTASSIUM CHLORIDE, CALCIUM CHLORIDE 600; 310; 30; 20 MG/100ML; MG/100ML; MG/100ML; MG/100ML
INJECTION, SOLUTION INTRAVENOUS CONTINUOUS
Status: DISCONTINUED | OUTPATIENT
Start: 2023-06-21 | End: 2023-06-22 | Stop reason: HOSPADM

## 2023-06-21 RX ORDER — ENOXAPARIN SODIUM 100 MG/ML
30 INJECTION SUBCUTANEOUS EVERY 12 HOURS SCHEDULED
Status: DISCONTINUED | OUTPATIENT
Start: 2023-06-22 | End: 2023-06-22 | Stop reason: HOSPADM

## 2023-06-21 RX ORDER — PROPOFOL 10 MG/ML
INJECTION, EMULSION INTRAVENOUS PRN
Status: DISCONTINUED | OUTPATIENT
Start: 2023-06-21 | End: 2023-06-21 | Stop reason: SDUPTHER

## 2023-06-21 RX ORDER — SODIUM CHLORIDE, SODIUM LACTATE, POTASSIUM CHLORIDE, CALCIUM CHLORIDE 600; 310; 30; 20 MG/100ML; MG/100ML; MG/100ML; MG/100ML
INJECTION, SOLUTION INTRAVENOUS CONTINUOUS
Status: DISCONTINUED | OUTPATIENT
Start: 2023-06-21 | End: 2023-06-21 | Stop reason: HOSPADM

## 2023-06-21 RX ORDER — SODIUM CHLORIDE 0.9 % (FLUSH) 0.9 %
5-40 SYRINGE (ML) INJECTION PRN
Status: DISCONTINUED | OUTPATIENT
Start: 2023-06-21 | End: 2023-06-22 | Stop reason: HOSPADM

## 2023-06-21 RX ORDER — GLYCOPYRROLATE 0.2 MG/ML
INJECTION INTRAMUSCULAR; INTRAVENOUS PRN
Status: DISCONTINUED | OUTPATIENT
Start: 2023-06-21 | End: 2023-06-21 | Stop reason: SDUPTHER

## 2023-06-21 RX ADMIN — HYDROMORPHONE HYDROCHLORIDE 0.5 MG: 2 INJECTION, SOLUTION INTRAMUSCULAR; INTRAVENOUS; SUBCUTANEOUS at 13:10

## 2023-06-21 RX ADMIN — HYDROMORPHONE HYDROCHLORIDE 0.5 MG: 2 INJECTION, SOLUTION INTRAMUSCULAR; INTRAVENOUS; SUBCUTANEOUS at 14:37

## 2023-06-21 RX ADMIN — ACETAMINOPHEN 650 MG: 650 SOLUTION ORAL at 10:01

## 2023-06-21 RX ADMIN — LIDOCAINE HYDROCHLORIDE 100 MG: 20 INJECTION, SOLUTION EPIDURAL; INFILTRATION; INTRACAUDAL; PERINEURAL at 11:39

## 2023-06-21 RX ADMIN — ROCURONIUM BROMIDE 20 MG: 10 INJECTION, SOLUTION INTRAVENOUS at 12:09

## 2023-06-21 RX ADMIN — Medication: at 13:33

## 2023-06-21 RX ADMIN — DIPHENHYDRAMINE HYDROCHLORIDE 12.5 MG: 50 INJECTION, SOLUTION INTRAMUSCULAR; INTRAVENOUS at 20:08

## 2023-06-21 RX ADMIN — Medication 20 MG: at 11:59

## 2023-06-21 RX ADMIN — DEXAMETHASONE SODIUM PHOSPHATE 10 MG: 4 INJECTION, SOLUTION INTRAMUSCULAR; INTRAVENOUS at 11:48

## 2023-06-21 RX ADMIN — SODIUM CHLORIDE, POTASSIUM CHLORIDE, SODIUM LACTATE AND CALCIUM CHLORIDE: 600; 310; 30; 20 INJECTION, SOLUTION INTRAVENOUS at 21:22

## 2023-06-21 RX ADMIN — CLINDAMYCIN PHOSPHATE 900 MG: 900 INJECTION, SOLUTION INTRAVENOUS at 11:31

## 2023-06-21 RX ADMIN — Medication 100 MCG: at 11:54

## 2023-06-21 RX ADMIN — LEVOFLOXACIN 500 MG: 5 INJECTION, SOLUTION INTRAVENOUS at 11:47

## 2023-06-21 RX ADMIN — FENTANYL CITRATE 50 MCG: 50 INJECTION, SOLUTION INTRAMUSCULAR; INTRAVENOUS at 11:37

## 2023-06-21 RX ADMIN — Medication 100 MCG: at 12:18

## 2023-06-21 RX ADMIN — ROCURONIUM BROMIDE 50 MG: 10 INJECTION, SOLUTION INTRAVENOUS at 11:45

## 2023-06-21 RX ADMIN — SODIUM CHLORIDE, POTASSIUM CHLORIDE, SODIUM LACTATE AND CALCIUM CHLORIDE: 600; 310; 30; 20 INJECTION, SOLUTION INTRAVENOUS at 11:35

## 2023-06-21 RX ADMIN — Medication 100 MCG: at 12:15

## 2023-06-21 RX ADMIN — FENTANYL CITRATE 50 MCG: 50 INJECTION, SOLUTION INTRAMUSCULAR; INTRAVENOUS at 12:01

## 2023-06-21 RX ADMIN — PROPOFOL 200 MG: 10 INJECTION, EMULSION INTRAVENOUS at 11:39

## 2023-06-21 RX ADMIN — ONDANSETRON 4 MG: 2 INJECTION INTRAMUSCULAR; INTRAVENOUS at 11:48

## 2023-06-21 RX ADMIN — HYDROMORPHONE HYDROCHLORIDE 0.5 MG: 2 INJECTION, SOLUTION INTRAMUSCULAR; INTRAVENOUS; SUBCUTANEOUS at 14:04

## 2023-06-21 RX ADMIN — MAGNESIUM SULFATE HEPTAHYDRATE 1 G: 500 INJECTION, SOLUTION INTRAMUSCULAR; INTRAVENOUS at 12:11

## 2023-06-21 RX ADMIN — GLYCOPYRROLATE 0.2 MG: 0.2 INJECTION, SOLUTION INTRAMUSCULAR; INTRAVENOUS at 11:57

## 2023-06-21 RX ADMIN — ROCURONIUM BROMIDE 10 MG: 10 INJECTION, SOLUTION INTRAVENOUS at 12:37

## 2023-06-21 RX ADMIN — SODIUM CHLORIDE, POTASSIUM CHLORIDE, SODIUM LACTATE AND CALCIUM CHLORIDE: 600; 310; 30; 20 INJECTION, SOLUTION INTRAVENOUS at 13:31

## 2023-06-21 RX ADMIN — Medication 100 MCG: at 12:03

## 2023-06-21 RX ADMIN — ENOXAPARIN SODIUM 30 MG: 30 INJECTION SUBCUTANEOUS at 10:01

## 2023-06-21 RX ADMIN — MIDAZOLAM 2 MG: 1 INJECTION INTRAMUSCULAR; INTRAVENOUS at 11:36

## 2023-06-21 RX ADMIN — HYDROMORPHONE HYDROCHLORIDE 0.5 MG: 2 INJECTION, SOLUTION INTRAMUSCULAR; INTRAVENOUS; SUBCUTANEOUS at 13:17

## 2023-06-21 RX ADMIN — Medication 100 MCG: at 11:56

## 2023-06-21 RX ADMIN — SUGAMMADEX 200 MG: 100 INJECTION, SOLUTION INTRAVENOUS at 12:51

## 2023-06-21 RX ADMIN — METHOCARBAMOL 500 MG: 100 INJECTION INTRAMUSCULAR; INTRAVENOUS at 12:41

## 2023-06-21 RX ADMIN — SUGAMMADEX 200 MG: 100 INJECTION, SOLUTION INTRAVENOUS at 12:50

## 2023-06-21 RX ADMIN — Medication 140 MG: at 11:39

## 2023-06-21 ASSESSMENT — PAIN DESCRIPTION - LOCATION
LOCATION: ABDOMEN

## 2023-06-21 ASSESSMENT — PAIN SCALES - GENERAL
PAINLEVEL_OUTOF10: 7
PAINLEVEL_OUTOF10: 5
PAINLEVEL_OUTOF10: 7
PAINLEVEL_OUTOF10: 6
PAINLEVEL_OUTOF10: 0

## 2023-06-21 ASSESSMENT — PAIN DESCRIPTION - ORIENTATION: ORIENTATION: MID

## 2023-06-21 ASSESSMENT — PAIN - FUNCTIONAL ASSESSMENT: PAIN_FUNCTIONAL_ASSESSMENT: 0-10

## 2023-06-21 ASSESSMENT — PAIN DESCRIPTION - DESCRIPTORS: DESCRIPTORS: ACHING;SORE

## 2023-06-21 ASSESSMENT — PAIN DESCRIPTION - ONSET: ONSET: ON-GOING

## 2023-06-21 ASSESSMENT — PAIN DESCRIPTION - PAIN TYPE: TYPE: SURGICAL PAIN

## 2023-06-21 ASSESSMENT — PAIN DESCRIPTION - FREQUENCY: FREQUENCY: INTERMITTENT

## 2023-06-21 NOTE — ANESTHESIA POSTPROCEDURE EVALUATION
Department of Anesthesiology  Postprocedure Note    Patient: Monico Mabry  MRN: [id number]  YOB: 1975  Date of evaluation: 6/21/2023      Procedure Summary     Date: 06/21/23 Room / Location: [facility] OR [address] [place] [address]    Anesthesia Start: 6081 Anesthesia Stop:     Procedure: LAPAROSCOPIC SLEEVE GASTRECTOMY (Abdomen) Diagnosis:       Morbid obesity ([address]      (Morbid obesity ([facility] [E66.01])    Surgeons: Jorge L Garcia MD Responsible Provider: Marilynn Goss MD    Anesthesia Type: general ASA Status: 3          Anesthesia Type: No value filed.     Lois Phase I: Lois Score: 10    Lois Phase II:        Anesthesia Post Evaluation    Patient location during evaluation: PACU  Patient participation: complete - patient participated  Level of consciousness: awake and alert  Pain score: 2  Airway patency: patent  Nausea & Vomiting: no vomiting  Complications: no  Cardiovascular status: blood pressure returned to baseline  Respiratory status: acceptable  Hydration status: euvolemic  Multimodal analgesia pain management approach

## 2023-06-21 NOTE — PROGRESS NOTES
Incentive Spirometry education and demonstration completed by Respiratory Therapy Yes      Response to education: Fair    Teaching Time: 5 minutes    Minimum Predicted Vital Capacity - 639 mL. Patient's Actual Vital Capacity - 500 mL.  Turning over to Nursing for routine follow-up No.    Comments: Pt unable to meet goal will continue to teach/ until Zachary Yarelis is met    Electronically signed by Christopher Kaufman RCP on 6/21/2023 at 6:13 PM

## 2023-06-21 NOTE — ANESTHESIA PRE PROCEDURE
Department of Anesthesiology  Preprocedure Note       Name:  Bess Aviles   Age:  46 y.o.  :  1975                                          MRN:  [id number]         Date:  2023      Surgeon: Marquise Cadet): Teodoro Oswald MD    Procedure: Procedure(s):  LAPAROSCOPIC SLEEVE GASTRECTOMY AND POSSIBLE OTHER INDICATED PROCEDURES-ETHICON/MEDTRONIC    Medications prior to admission:   Prior to Admission medications    Medication Sig Start Date End Date Taking? Authorizing Provider   omeprazole (PRILOSEC) 20 MG delayed release capsule Take 1 capsule by mouth every morning (before breakfast) 23   Teodoro Oswald MD   amLODIPine (NORVASC) 5 MG tablet Take 1 tablet by mouth daily 11/15/22   Amena Bernard MD   buPROPion [facility]) 150 MG extended release tablet Take 1 tablet by mouth 2 times daily 22   Jameel Chun, APRN - CNP       Current medications:    Current Outpatient Medications   Medication Sig Dispense Refill    omeprazole (PRILOSEC) 20 MG delayed release capsule Take 1 capsule by mouth every morning (before breakfast) 90 capsule 1    amLODIPine (NORVASC) 5 MG tablet Take 1 tablet by mouth daily 90 tablet 1    buPROPion (WELLBUTRIN SR) 150 MG extended release tablet Take 1 tablet by mouth 2 times daily 60 tablet 5     No current facility-administered medications for this visit. Allergies:     Allergies   Allergen Reactions    Morphine Itching    Keflex [Cephalexin] Diarrhea and Nausea And Vomiting       Problem List:    Patient Active Problem List   Diagnosis Code    Menorrhagia with regular cycle N92.0    Fibroid uterus D25.9    Adnexal mass N94.89    Chronic pelvic pain in female R10.2, G89.29    Morbid obesity with BMI of 40.0-44.9, adult ([address] E66.01, Z68.41    Sleep apnea G47.30    Mild episode of recurrent major depressive disorder ([address] F33.0    Obstructive sleep apnea, adult G47.33    HTN (hypertension) I10    Dyslipidemia (high LDL; low HDL) E78.5   

## 2023-06-21 NOTE — PROGRESS NOTES
Pt to room 4470 from PACU. Transferred self to bathroom for void then into bed. VSS. O2 sat was 89% on room air after return to bed. Placed on 2 liters via capnography cannula. Spouse at bedside pt axox4 and able to provided admission info. Assessment completed. Oriented to call light and room. Will continue to monitor.

## 2023-06-21 NOTE — OP NOTE
bleeding from staple line. The stomach was confirmed to be completely divided with uniform shape,  no twist in the sleeve and wide patent incisura. A 2-0 vicryl suture was used to over-sew and imbricate the sleeve staple line. The staple line was completely over-sewn. The dilator was removed and an Edlich tube was advanced across the sleeve to ensure patency mainly at incisura, then retracted to just above the GE junction. The stomach distal to the staple line was clamped and methylene blue saline was injected under pressure confirming No obstruction (flow noted to duodenum) and No leak. The abdomen was carefully inspected and there was no bleeding or any other abnormality. The stomach was brought out through the RUQ incision. Hemostasis was confirmed. Snow applied along suture line and/or biopsy sites to ensure hemostasis. The 15 and 12 port sites was closed using 0.0 Vicryl  suture at the level of the fascia and that was done under direct vision with the laparoscope to ensure proper closure and nothing entrapped. Local Anesthetic used at port sites. The trocar site skin wounds were closed using 4.0 Vicryl after copious Irrigation of the wounds. Dermabond / or steri strips applied. Instrument, sponge, and needle counts were correct at the conclusion of the case. Findings:  As above. Estimated Blood Loss:  Minimal           Drains: none           Total IV Fluids: 1000 ml           Specimens: Stomach (Subtotal)            Complications:  None; patient tolerated the procedure well. Disposition: PACU - hemodynamically stable. Condition: stable    Attending Attestation: I was present and scrubbed for the entire procedure.       Electronically signed by Byron Pizarro MD , FACS, FASMBS  on 6/21/2023 at 12:50 PM

## 2023-06-21 NOTE — PROGRESS NOTES
Pt arrived from OR to PACU, arouses to stimuli. VSS, O2 sats 94% on 6 L simple mask. Incisions to abdomen dry and intact, abdomen soft, ice pack in place. Will monitor.

## 2023-06-21 NOTE — H&P
surgeon. I did explain thoroughly to the patient that compliance with pre- and post op diet and other recommendations are integral part to improve the chances of successful weight loss and also not following it could end with serious health complications. We discussed that our goal is to ameliorate the medical problems and not to obtain a specific body mass index. Patient understands the risks and benefits and wishes to proceed with the procedure. Also understands if BMI is lower than 40 without significant co morbid conditions, concerns for risks of surgery being somewhat higher over long run, however patient wants to proceed and fully understands the risks. Clearly BMI over 35 does impose very serious health risks as well chances of losing weight on diet only is very limited and sustaining weight loss is even less, thus surgery is certainly recommended for long term weight loss and better health overall given compliance. I advised the patient that we can't guarantee final insurance approval.      The patient was counseled at length about the risks of jeninfer Covid-19 during their perioperative period and any recovery window from their procedure. The patient was made aware that jennifer Covid-19  may worsen their prognosis for recovering from their procedure  and lend to a higher morbidity and/or mortality risk. All material risks, benefits, and reasonable alternatives including postponing the procedure were discussed. The patient does wish to proceed with the procedure at this time.       Patient Active Problem List    Diagnosis Date Noted    Chronic GERD 02/24/2023     Priority: Medium    Dyslipidemia (high LDL; low HDL) 02/21/2023     Priority: Medium    HTN (hypertension) 11/15/2022     Priority: Medium    Obstructive sleep apnea, adult 11/09/2022     Priority: Medium    Mild [facility] of recurrent major [facility] disorder [facility] 06/14/2022     Priority: Medium    Morbid obesity with BMI of

## 2023-06-21 NOTE — DISCHARGE INSTRUCTIONS
Unless otherwise noted you will crush all your medications for the first 2 weeks post op and mix them with clears for the first 2 days and then with your pureed food for the remainder of the 2 weeks. If you do not tolerate your medications crushed you may quarter or cut in half and take one piece at a time. You may take your omeprazole (Prilosec) and bupropion (Wellbutrin SR) whole as they cannot be opened or crushed. After two weeks, you may start taking all your pills whole. Please make sure when taking whole pills, you do not take them all at once. Take them one at a time and allow a minute or so between each one. Medications    As discussed during your pre-surgical visits there may need to be changes made to your home medications following surgery. A benefit of weight loss and healthy dietary habits can be an improvement in your blood pressure, blood sugars and edema (swelling). Blood Pressure    Following surgery monitor your blood pressure to evaluate for low blood pressure. A normal blood pressure is generally considered less than 120/80. If your blood pressure is lower than normal and/or youre experiencing symptoms of hypotension such as fatigue, blurred vision, lightheadedness or dizziness please contact your PCP for them to evaluate the need to lower or discontinue your blood pressure medication(s). Activity  You are encouraged to walk through the entire postoperative period as this will help with preventing clots, pneumonia and constipation. You are restricted from lifting anything greater than 10 lbs for the first 6 weeks after surgery. You may shower starting on postoperative day #2 (second day after surgery), but should not get into baths, pools and/or hot tubs until the provider releases you to do so. Driving  You should not drive for at least the first week after your surgery and/or if you are still taking pain medication.  Most patients generally drive to their 2-week postoperative

## 2023-06-21 NOTE — PROGRESS NOTES
Pt resting quietly in bed with eyes closed, awakens to voice. VSS, O2 sats 94% on 4 L NC. Incisions to abdomen dry and intact, abdomen soft, ice pack in place. Pt seen by anesthesia, phase 1 criteria met. Awaiting bed placement on 4T. Will monitor and transfer pt when room ready.

## 2023-06-22 ENCOUNTER — APPOINTMENT (OUTPATIENT)
Dept: GENERAL RADIOLOGY | Age: 48
DRG: 621 | End: 2023-06-22
Attending: SURGERY
Payer: COMMERCIAL

## 2023-06-22 VITALS
SYSTOLIC BLOOD PRESSURE: 111 MMHG | BODY MASS INDEX: 39.86 KG/M2 | TEMPERATURE: 97.6 F | WEIGHT: 263 LBS | OXYGEN SATURATION: 97 % | RESPIRATION RATE: 16 BRPM | DIASTOLIC BLOOD PRESSURE: 67 MMHG | HEART RATE: 69 BPM | HEIGHT: 68 IN

## 2023-06-22 PROBLEM — E66.01 MORBID OBESITY WITH BMI OF 40.0-44.9, ADULT (HCC): Chronic | Status: RESOLVED | Noted: 2021-03-31 | Resolved: 2023-06-22

## 2023-06-22 LAB
ANION GAP SERPL CALCULATED.3IONS-SCNC: 7 MMOL/L (ref 3–16)
BUN SERPL-MCNC: 8 MG/DL (ref 7–20)
CALCIUM SERPL-MCNC: 9.3 MG/DL (ref 8.3–10.6)
CHLORIDE SERPL-SCNC: 104 MMOL/L (ref 99–110)
CO2 SERPL-SCNC: 28 MMOL/L (ref 21–32)
CREAT SERPL-MCNC: 0.8 MG/DL (ref 0.6–1.1)
DEPRECATED RDW RBC AUTO: 12.9 % (ref 12.4–15.4)
GFR SERPLBLD CREATININE-BSD FMLA CKD-EPI: >60 ML/MIN/{1.73_M2}
GLUCOSE SERPL-MCNC: 114 MG/DL (ref 70–99)
HCT VFR BLD AUTO: 37.9 % (ref 36–48)
HGB BLD-MCNC: 12.4 G/DL (ref 12–16)
MCH RBC QN AUTO: 29.4 PG (ref 26–34)
MCHC RBC AUTO-ENTMCNC: 32.6 G/DL (ref 31–36)
MCV RBC AUTO: 90 FL (ref 80–100)
PLATELET # BLD AUTO: 268 K/UL (ref 135–450)
PMV BLD AUTO: 8.4 FL (ref 5–10.5)
POTASSIUM SERPL-SCNC: 4.3 MMOL/L (ref 3.5–5.1)
RBC # BLD AUTO: 4.21 M/UL (ref 4–5.2)
SODIUM SERPL-SCNC: 139 MMOL/L (ref 136–145)
WBC # BLD AUTO: 14.1 K/UL (ref 4–11)

## 2023-06-22 PROCEDURE — 80048 BASIC METABOLIC PNL TOTAL CA: CPT

## 2023-06-22 PROCEDURE — APPSS180 APP SPLIT SHARED TIME > 60 MINUTES: Performed by: NURSE PRACTITIONER

## 2023-06-22 PROCEDURE — 6360000002 HC RX W HCPCS: Performed by: NURSE PRACTITIONER

## 2023-06-22 PROCEDURE — 6370000000 HC RX 637 (ALT 250 FOR IP): Performed by: NURSE PRACTITIONER

## 2023-06-22 PROCEDURE — 6360000002 HC RX W HCPCS: Performed by: SURGERY

## 2023-06-22 PROCEDURE — 6360000004 HC RX CONTRAST MEDICATION

## 2023-06-22 PROCEDURE — A4216 STERILE WATER/SALINE, 10 ML: HCPCS | Performed by: SURGERY

## 2023-06-22 PROCEDURE — 85027 COMPLETE CBC AUTOMATED: CPT

## 2023-06-22 PROCEDURE — C9113 INJ PANTOPRAZOLE SODIUM, VIA: HCPCS | Performed by: SURGERY

## 2023-06-22 PROCEDURE — 2580000003 HC RX 258: Performed by: SURGERY

## 2023-06-22 PROCEDURE — 99024 POSTOP FOLLOW-UP VISIT: CPT | Performed by: NURSE PRACTITIONER

## 2023-06-22 PROCEDURE — 74240 X-RAY XM UPR GI TRC 1CNTRST: CPT

## 2023-06-22 PROCEDURE — 6370000000 HC RX 637 (ALT 250 FOR IP): Performed by: SURGERY

## 2023-06-22 RX ORDER — OXYCODONE HYDROCHLORIDE AND ACETAMINOPHEN 5; 325 MG/1; MG/1
1 TABLET ORAL EVERY 6 HOURS PRN
Qty: 28 TABLET | Refills: 0 | Status: SHIPPED | OUTPATIENT
Start: 2023-06-22 | End: 2023-06-29

## 2023-06-22 RX ORDER — KETOROLAC TROMETHAMINE 30 MG/ML
15 INJECTION, SOLUTION INTRAMUSCULAR; INTRAVENOUS EVERY 6 HOURS
Status: COMPLETED | OUTPATIENT
Start: 2023-06-22 | End: 2023-06-22

## 2023-06-22 RX ORDER — PROMETHAZINE HYDROCHLORIDE 6.25 MG/5ML
12.5 SYRUP ORAL EVERY 6 HOURS PRN
Status: DISCONTINUED | OUTPATIENT
Start: 2023-06-22 | End: 2023-06-22 | Stop reason: HOSPADM

## 2023-06-22 RX ORDER — OXYCODONE HYDROCHLORIDE AND ACETAMINOPHEN 5; 325 MG/1; MG/1
1 TABLET ORAL EVERY 4 HOURS PRN
Status: DISCONTINUED | OUTPATIENT
Start: 2023-06-22 | End: 2023-06-22 | Stop reason: HOSPADM

## 2023-06-22 RX ORDER — ONDANSETRON 8 MG/1
8 TABLET, ORALLY DISINTEGRATING ORAL EVERY 8 HOURS PRN
Qty: 30 TABLET | Refills: 0 | Status: SHIPPED | OUTPATIENT
Start: 2023-06-22

## 2023-06-22 RX ORDER — ONDANSETRON 4 MG/1
8 TABLET, ORALLY DISINTEGRATING ORAL EVERY 8 HOURS PRN
Status: DISCONTINUED | OUTPATIENT
Start: 2023-06-22 | End: 2023-06-22 | Stop reason: HOSPADM

## 2023-06-22 RX ORDER — OXYCODONE HYDROCHLORIDE AND ACETAMINOPHEN 5; 325 MG/1; MG/1
2 TABLET ORAL EVERY 4 HOURS PRN
Status: DISCONTINUED | OUTPATIENT
Start: 2023-06-22 | End: 2023-06-22 | Stop reason: HOSPADM

## 2023-06-22 RX ORDER — ONDANSETRON 8 MG/1
8 TABLET, ORALLY DISINTEGRATING ORAL EVERY 8 HOURS PRN
Qty: 30 TABLET | Refills: 1 | Status: SHIPPED | OUTPATIENT
Start: 2023-06-22

## 2023-06-22 RX ADMIN — DIPHENHYDRAMINE HYDROCHLORIDE 12.5 MG: 50 INJECTION, SOLUTION INTRAMUSCULAR; INTRAVENOUS at 08:15

## 2023-06-22 RX ADMIN — DIPHENHYDRAMINE HYDROCHLORIDE 12.5 MG: 50 INJECTION, SOLUTION INTRAMUSCULAR; INTRAVENOUS at 02:19

## 2023-06-22 RX ADMIN — ENOXAPARIN SODIUM 30 MG: 100 INJECTION SUBCUTANEOUS at 00:25

## 2023-06-22 RX ADMIN — SODIUM CHLORIDE 40 MG: 9 INJECTION INTRAMUSCULAR; INTRAVENOUS; SUBCUTANEOUS at 09:00

## 2023-06-22 RX ADMIN — IOHEXOL 50 ML: 350 INJECTION, SOLUTION INTRAVENOUS at 08:29

## 2023-06-22 RX ADMIN — OXYCODONE AND ACETAMINOPHEN 2 TABLET: 5; 325 TABLET ORAL at 11:16

## 2023-06-22 RX ADMIN — KETOROLAC TROMETHAMINE 15 MG: 30 INJECTION, SOLUTION INTRAMUSCULAR; INTRAVENOUS at 11:53

## 2023-06-22 RX ADMIN — DIPHENHYDRAMINE HYDROCHLORIDE 12.5 MG: 50 INJECTION, SOLUTION INTRAMUSCULAR; INTRAVENOUS at 13:54

## 2023-06-22 RX ADMIN — OXYCODONE AND ACETAMINOPHEN 2 TABLET: 5; 325 TABLET ORAL at 15:39

## 2023-06-22 RX ADMIN — SODIUM CHLORIDE, POTASSIUM CHLORIDE, SODIUM LACTATE AND CALCIUM CHLORIDE: 600; 310; 30; 20 INJECTION, SOLUTION INTRAVENOUS at 02:25

## 2023-06-22 ASSESSMENT — PAIN DESCRIPTION - LOCATION
LOCATION: ABDOMEN

## 2023-06-22 ASSESSMENT — PAIN SCALES - GENERAL
PAINLEVEL_OUTOF10: 5
PAINLEVEL_OUTOF10: 7
PAINLEVEL_OUTOF10: 5
PAINLEVEL_OUTOF10: 5
PAINLEVEL_OUTOF10: 7

## 2023-06-22 ASSESSMENT — PAIN DESCRIPTION - PAIN TYPE
TYPE: SURGICAL PAIN
TYPE: SURGICAL PAIN

## 2023-06-22 ASSESSMENT — PAIN DESCRIPTION - DESCRIPTORS
DESCRIPTORS: ACHING

## 2023-06-22 NOTE — DISCHARGE SUMMARY
The patient was admitted on day of surgery and underwent a laparoscopic sleeve gastrectomy. Surgery went well and the patient went to our post-op bariatric floor. Overnight, the patient had stable vitals signs, good urine output and ambulated in the halls. On POD #1 the patient underwent an UGI which revealed no leak or obstruction. Phase I diet was started and the patient tolerated well. The patient has no N/V, tolerating diet, ambulating and pain controlled on oral medication. The patient was discharged home today in stable condition. The patient will f/u in 2 weeks and was given dietary and ambulation instruction. The patient was instructed to call if there are any questions or concerns. Patient Active Problem List    Diagnosis Date Noted    Chronic GERD 02/24/2023     Priority: Medium    Dyslipidemia (high LDL; low HDL) 02/21/2023     Priority: Medium    HTN (hypertension) 11/15/2022     Priority: Medium    Obstructive sleep apnea, adult 11/09/2022     Priority: Medium    Mild episode of recurrent major depressive disorder ([facility] 06/14/2022     Priority: Medium    Severe obesity (BMI 35.0-39. 9) with comorbidity ([address] 06/21/2023    S/P laparoscopic sleeve gastrectomy 06/21/2023    Morbid obesity with BMI of 40.0-44.9, adult ([address] 03/31/2021    Sleep apnea 03/31/2021    Menorrhagia with regular cycle 09/09/2016    Fibroid uterus 09/09/2016    Adnexal mass 09/09/2016    Chronic pelvic pain in female 09/09/2016

## 2023-06-22 NOTE — PROGRESS NOTES
2003: assessment completed, VSS, call light within reach, ice packs applied and abdominal binder on, ambulated to the hallway multiple times, will continue to monitor. The care plan and education has been reviewed and mutually agreed upon with the patient.

## 2023-06-22 NOTE — PROGRESS NOTES
Incentive Spirometry education and demonstration completed by Respiratory Therapy Yes      Response to education: Excellent     Teaching Time: 5 minutes    Minimum Predicted Vital Capacity - 639 mL. Patient's Actual Vital Capacity - 800 mL. Turning over to Nursing for routine follow-up Yes.     Comments: Pt understands the use and purpose of the IS     Electronically signed by Samia Barker RCP on 6/22/2023 at 1:37 AM

## 2023-06-22 NOTE — PROGRESS NOTES
CLINICAL PHARMACY NOTE: MEDS TO BEDS    Total # of Prescriptions Filled: 2   The following medications were delivered to the patient:  Zofran 8 mg  Percocet 5-325 mg    Additional Documentation:  Delivered to patient=signed  Ok to be delivered per Michael Tao CPhT

## 2023-06-22 NOTE — CARE COORDINATION
Discharge Planning Note:    Chart reviewed and it appears that patient has minimal needs for discharge at this time. Discussed with patients nurse and requested that case management be notified if discharge needs are identified.     - Current discharge plan is for the patient to return home. Case management will continue to follow progress and update discharge plan as needed.       Risk of Readmission Score: 3%    HANNAH Garcia RN    [facility]  Phone: [phone]

## 2023-06-22 NOTE — PROGRESS NOTES
[facility]) Physicians   General & Laparoscopic Surgery  [employer] [facility] [employer]       Pt seen and examined    S/p laparoscopic sleeve gastrectomy     The patient is ambulating in robin. Pain is well controlled. There is no nausea and/or vomiting. There are no other complaints or questions. Vitals:    06/22/23 0340 06/22/23 0430 06/22/23 0615 06/22/23 0830   BP:  131/68 123/78 125/79   Pulse:  68 63 62   Resp: 13 19 18 16   Temp:  97.9 °F (36.6 °C)  98 °F (36.7 °C)   TempSrc:  Axillary  Oral   SpO2:  96% 98% 97%   Weight:       Height:         Abdomen is soft, appropriately tender, incisions stable with no erythema. Breath sounds are clear bilaterally. Bowel sounds are hypoactive in all quadrants.     Data  CBC:   Lab Results   Component Value Date/Time    WBC 14.1 06/22/2023 04:57 AM    RBC 4.21 06/22/2023 04:57 AM    HGB 12.4 06/22/2023 04:57 AM    HCT 37.9 06/22/2023 04:57 AM    MCV 90.0 06/22/2023 04:57 AM    MCH 29.4 06/22/2023 04:57 AM    MCHC 32.6 06/22/2023 04:57 AM    RDW 12.9 06/22/2023 04:57 AM     06/22/2023 04:57 AM    MPV 8.4 06/22/2023 04:57 AM     BMP:    Lab Results   Component Value Date/Time     06/22/2023 04:57 AM    K 4.3 06/22/2023 04:57 AM     06/22/2023 04:57 AM    CO2 28 06/22/2023 04:57 AM    BUN 8 06/22/2023 04:57 AM    LABALBU 4.6 06/12/2023 05:02 PM    CREATININE 0.8 06/22/2023 04:57 AM    CALCIUM 9.3 06/22/2023 04:57 AM    GFRAA >60 06/23/2022 02:26 PM    LABGLOM >60 06/22/2023 04:57 AM    GLUCOSE 114 06/22/2023 04:57 AM     Current Inpatient Medications    Current Facility-Administered Medications: ondansetron (ZOFRAN-ODT) disintegrating tablet 8 mg, 8 mg, Oral, Q8H PRN  promethazine (PHENERGAN) 6.25 MG/5ML syrup 12.5 mg, 12.5 mg, Oral, Q6H PRN  oxyCODONE-acetaminophen (PERCOCET) 5-325 MG per tablet 1 tablet, 1 tablet, Oral, Q4H PRN **OR** oxyCODONE-acetaminophen (PERCOCET) 5-325 MG per tablet 2 tablet, 2 tablet, Oral, Q4H PRN  ketorolac (TORADOL) injection

## 2023-06-22 NOTE — PROGRESS NOTES
Pt resting awake in bed. Lap sites to abd WDL. Has ambulated multiple laps in robin this morning. C/o itching from pain medication. Benadryl given. To xray for UGI. Will continue to monitor. Pt is axox4 and able to answer questions and follow commands throughout assessment.

## 2023-06-23 ENCOUNTER — TELEPHONE (OUTPATIENT)
Dept: INTERNAL MEDICINE CLINIC | Age: 48
End: 2023-06-23

## 2023-06-23 NOTE — TELEPHONE ENCOUNTER
Care Transitions Initial Follow Up Call    Outreach made within 2 business days of discharge: Yes    Patient: Roberto Dennison Patient : 1975   MRN: [id number]  Reason for Admission: There are no discharge diagnoses documented for the most recent discharge. Discharge Date: 23       Spoke with: patient    Discharge department/facility: [facility]    TCM Interactive Patient Contact:  Was patient able to fill all prescriptions: Yes  Was patient instructed to bring all medications to the follow-up visit: Yes  Is patient taking all medications as directed in the discharge summary?  Yes  Does patient understand their discharge instructions: Yes  Does patient have questions or concerns that need addressed prior to 7-14 day follow up office visit: no    Scheduled appointment with PCP within 7-14 days    Follow Up  Future Appointments   Date Time Provider Ezekiel Hutson   2023 11:20 AM Ilia Pore, APRN - CNP [facility] [place] [facility]   2023 11:30 AM Samuel Reyes MD HEALTHY WT [facility]   2023 12:00 PM LUZ MARIA Cameron CNP FF SLEEP MED [facility]       Baljit Hahn

## 2023-06-26 ENCOUNTER — TELEPHONE (OUTPATIENT)
Dept: BARIATRICS/WEIGHT MGMT | Age: 48
End: 2023-06-26

## 2023-07-03 DIAGNOSIS — F33.0 MILD EPISODE OF RECURRENT MAJOR DEPRESSIVE DISORDER (HCC): ICD-10-CM

## 2023-07-03 DIAGNOSIS — E66.01 CLASS 3 SEVERE OBESITY DUE TO EXCESS CALORIES WITHOUT SERIOUS COMORBIDITY WITH BODY MASS INDEX (BMI) OF 40.0 TO 44.9 IN ADULT (HCC): ICD-10-CM

## 2023-07-03 RX ORDER — BUPROPION HYDROCHLORIDE 150 MG/1
TABLET, EXTENDED RELEASE ORAL
Qty: 60 TABLET | Refills: 5 | Status: SHIPPED | OUTPATIENT
Start: 2023-07-03

## 2023-07-03 RX ORDER — AMLODIPINE BESYLATE 5 MG/1
TABLET ORAL
Qty: 30 TABLET | Refills: 3 | Status: SHIPPED | OUTPATIENT
Start: 2023-07-03

## 2023-07-06 ENCOUNTER — OFFICE VISIT (OUTPATIENT)
Dept: BARIATRICS/WEIGHT MGMT | Age: 48
End: 2023-07-06

## 2023-07-06 VITALS
BODY MASS INDEX: 39.3 KG/M2 | WEIGHT: 250.4 LBS | HEIGHT: 67 IN | HEART RATE: 78 BPM | SYSTOLIC BLOOD PRESSURE: 118 MMHG | DIASTOLIC BLOOD PRESSURE: 72 MMHG | OXYGEN SATURATION: 97 % | RESPIRATION RATE: 18 BRPM

## 2023-07-06 DIAGNOSIS — Z98.84 S/P LAPAROSCOPIC SLEEVE GASTRECTOMY: Primary | ICD-10-CM

## 2023-07-06 PROCEDURE — 99024 POSTOP FOLLOW-UP VISIT: CPT | Performed by: SURGERY

## 2023-07-06 NOTE — PROGRESS NOTES
[address] Physicians   [facility]  Hay Nicole MD, [facility], [address] [place] [address]  [address] . Phone: [phone]  Fax: [phone]       The patient is a 46 y.o. female who returns today for follow up. Daivd Roberts is s/p     Laparoscopic sleeve gastrectomy    We discussed how her weight affects her overall health including:  Patient Active Problem List   Diagnosis    Menorrhagia with regular cycle    Fibroid uterus    Adnexal mass    Chronic pelvic pain in female    Sleep apnea    Mild episode of recurrent major depressive disorder ([address])    Obstructive sleep apnea, adult    HTN (hypertension)    Dyslipidemia (high LDL; low HDL)    Chronic GERD    Severe obesity (BMI 35.0-39. 9) with comorbidity ([address])    S/P laparoscopic sleeve gastrectomy        Vitals:    07/06/23 1127   BP: 118/72   Pulse: 78   Resp: 18   SpO2: 97%   Weight: 250 lb 6.4 oz (113.6 kg)   Height: 5' 7\" (1.702 m)       Lab Results   Component Value Date/Time    WBC 14.1 06/22/2023 04:57 AM    RBC 4.21 06/22/2023 04:57 AM    HGB 12.4 06/22/2023 04:57 AM    HCT 37.9 06/22/2023 04:57 AM    MCV 90.0 06/22/2023 04:57 AM    MCH 29.4 06/22/2023 04:57 AM    MCHC 32.6 06/22/2023 04:57 AM    MPV 8.4 06/22/2023 04:57 AM    NEUTOPHILPCT 49.0 02/13/2023 02:55 PM    LYMPHOPCT 39.0 02/13/2023 02:55 PM    MONOPCT 10.0 02/13/2023 02:55 PM    EOSRELPCT 0.0 02/13/2023 02:55 PM    BASOPCT 0.0 02/13/2023 02:55 PM    NEUTROABS 3.4 02/13/2023 02:55 PM    LYMPHSABS 2.6 02/13/2023 02:55 PM    MONOSABS 0.7 02/13/2023 02:55 PM    EOSABS 0.0 02/13/2023 02:55 PM     Lab Results   Component Value Date/Time     06/22/2023 04:57 AM    K 4.3 06/22/2023 04:57 AM     06/22/2023 04:57 AM    CO2 28 06/22/2023 04:57 AM    ANIONGAP 7 06/22/2023 04:57 AM    GLUCOSE 114 06/22/2023 04:57 AM    BUN 8 06/22/2023 04:57 AM    CREATININE 0.8 06/22/2023 04:57 AM    LABGLOM >60 06/22/2023 04:57 AM    GFRAA >60 06/23/2022 02:26 PM

## 2023-07-06 NOTE — PROGRESS NOTES
Dietary Assessment Note      Vitals:   Vitals:    07/06/23 1127   BP: 118/72   Pulse: 78   Resp: 18   SpO2: 97%   Weight: 250 lb 6.4 oz (113.6 kg)   Height: 5' 7\" (1.702 m)    Patient lost 22.6 lbs over 1 month. Total Weight Loss: 22.6 lbs    Labs reviewed: labs are reviewed, up to date and normal    Protein intake: <60 grams/day     Fluid intake: >64 oz/day; usually 100 oz./day or more      Multivitamin/mineral intake:  Fusion MVI capsule     Calcium intake: no    Other: none     Exercise:  walking at work       Nutrition Assessment: 2 week post-op visit. Pt drinking  Gatorade zero-100 oz/day, 1 cup of coffee/day  Pt drinking 2 premier shakes/day and states that she has tried different protein powder flavors (Nectar/Unjury) but causing her to gag or vomit. Pt having Pureed foods-instant mashed potatoes, fat free refried beans, grits and SF fudge pops and SF jello. Pt having foods 2x/day. Amount able to eat per sitting: 3 oz.       Following 30/30/30 rule: Yes      Food Intolerances/issues: none    Client Concerns: none      Goals:   Start phase 3 at 3 weeks post op   Decrease fluid intake to 64 oz/day  Increase protein intake to 60-80 grams/day-focus on pureed rich protein foods-reviewed sources or try protein rios to meet goal     Plan: F/U per provider    Ishan Fitzgerald RD, LD

## 2023-08-08 ENCOUNTER — OFFICE VISIT (OUTPATIENT)
Dept: BARIATRICS/WEIGHT MGMT | Age: 48
End: 2023-08-08

## 2023-08-08 VITALS
WEIGHT: 238 LBS | BODY MASS INDEX: 37.35 KG/M2 | HEART RATE: 86 BPM | SYSTOLIC BLOOD PRESSURE: 122 MMHG | RESPIRATION RATE: 18 BRPM | HEIGHT: 67 IN | OXYGEN SATURATION: 99 % | DIASTOLIC BLOOD PRESSURE: 60 MMHG

## 2023-08-08 DIAGNOSIS — Z98.84 S/P LAPAROSCOPIC SLEEVE GASTRECTOMY: Primary | ICD-10-CM

## 2023-08-08 PROCEDURE — 99024 POSTOP FOLLOW-UP VISIT: CPT | Performed by: SURGERY

## 2023-08-08 NOTE — PATIENT INSTRUCTIONS
Patient received dietary handouts and education.     Goals: Advance to Phase 4 diet.  -Aim for 1,000-1,200 kcal/day, 60-80 g protein/day, and 48-64 oz H2O/day  -Include a protein source w all snacks  -Limit high fat food choices- especially when dining out  -Prioritize protein & produce  -Increase daily physical activity as tolerated  -Take MVI as directed- 2 calcium chews/day (take separately)

## 2023-08-08 NOTE — PROGRESS NOTES
Dietary Assessment Note      Vitals:   Vitals:    23 1124   BP: 122/60   Pulse: 86   Resp: 18   SpO2: 99%   Weight: 238 lb (108 kg)   Height: 5' 7\" (1.702 m)    Patient lost 12.4 lbs over 1 month. Total Weight Loss: 35 lbs    Labs reviewed: no lab studies available for review at time of visit    Protein intake: 60 grams/day     Fluid intake: 48-64 oz/day    Multivitamin/mineral intake: 1 OTC women's MVI    Calcium intake: not yet    Other: none    Exercise: ADL     Nutrition Assessment: 6 week post-op visit. B: Premier protein shake  S: peaches in water   L:  Premier protein shake OR grilled chix + apple sauce    S: none   D: Progressive soup    S: none OR sf pudding   Drinks: 48 oz Gatorade zero/day, water w CL      Amount able to eat per sittin-5 oz    Following 30/30/30 rule: yes    - fluids from foods: yes   -Eating over 20-25 minutes.      Food Intolerances/issues: higher fat foods/ foods from restaurants, salad     Client Concerns: none    Goals: Advance to Phase 4 diet.  -Aim for 1,000-1,200 kcal/day, 60-80 g protein/day, and 48-64 oz H2O/day  -Include a protein source w all snacks  -Limit high fat food choices- especially when dining out  -Prioritize protein & produce  -Increase daily physical activity as tolerated  -Take MVI as directed- 2 calcium chews/day (take separately)    Handouts:   -Protein-based snacks  -MVI alternatives   -Phase 4 guidelines     Plan: Follow up per provider    Governor [place], JEFFRY

## 2023-08-08 NOTE — PROGRESS NOTES
[facility]) Physicians   [employer] [facility] [employer]  Reva Curling, MD, [facility], [address] [place] [address], [address] [place] [address]  [id number] . Phone: [phone]  Fax: [phone]       The patient is a 46 y.o. female who returns today for follow up. Gracelyn Lesches is s/p     Laparoscopic sleeve gastrectomy    We discussed how her weight affects her overall health including:  Patient Active Problem List   Diagnosis    Menorrhagia with regular cycle    Fibroid uterus    Adnexal mass    Chronic pelvic pain in female    Sleep apnea    Mild episode of recurrent major depressive disorder ([address])    Obstructive sleep apnea, adult    HTN (hypertension)    Dyslipidemia (high LDL; low HDL)    Chronic GERD    Severe obesity (BMI 35.0-39. 9) with comorbidity ([address])    S/P laparoscopic sleeve gastrectomy        Vitals:    08/08/23 1124   BP: 122/60   Pulse: 86   Resp: 18   SpO2: 99%   Weight: 238 lb (108 kg)   Height: 5' 7\" (1.702 m)       Lab Results   Component Value Date/Time    WBC 14.1 06/22/2023 04:57 AM    RBC 4.21 06/22/2023 04:57 AM    HGB 12.4 06/22/2023 04:57 AM    HCT 37.9 06/22/2023 04:57 AM    MCV 90.0 06/22/2023 04:57 AM    MCH 29.4 06/22/2023 04:57 AM    MCHC 32.6 06/22/2023 04:57 AM    MPV 8.4 06/22/2023 04:57 AM    NEUTOPHILPCT 49.0 02/13/2023 02:55 PM    LYMPHOPCT 39.0 02/13/2023 02:55 PM    MONOPCT 10.0 02/13/2023 02:55 PM    EOSRELPCT 0.0 02/13/2023 02:55 PM    BASOPCT 0.0 02/13/2023 02:55 PM    NEUTROABS 3.4 02/13/2023 02:55 PM    LYMPHSABS 2.6 02/13/2023 02:55 PM    MONOSABS 0.7 02/13/2023 02:55 PM    EOSABS 0.0 02/13/2023 02:55 PM     Lab Results   Component Value Date/Time     06/22/2023 04:57 AM    K 4.3 06/22/2023 04:57 AM     06/22/2023 04:57 AM    CO2 28 06/22/2023 04:57 AM    ANIONGAP 7 06/22/2023 04:57 AM    GLUCOSE 114 06/22/2023 04:57 AM    BUN 8 06/22/2023 04:57 AM    CREATININE 0.8 06/22/2023 04:57 AM    LABGLOM >60 06/22/2023 04:57 AM    GFRAA >60 06/23/2022 02:26 PM

## 2023-08-25 RX ORDER — OMEPRAZOLE 20 MG/1
CAPSULE, DELAYED RELEASE ORAL
Qty: 90 CAPSULE | Refills: 1 | Status: SHIPPED | OUTPATIENT
Start: 2023-08-25

## 2023-10-03 ENCOUNTER — OFFICE VISIT (OUTPATIENT)
Dept: BARIATRICS/WEIGHT MGMT | Age: 48
End: 2023-10-03
Payer: COMMERCIAL

## 2023-10-03 VITALS
HEIGHT: 67 IN | WEIGHT: 224 LBS | OXYGEN SATURATION: 100 % | DIASTOLIC BLOOD PRESSURE: 86 MMHG | RESPIRATION RATE: 18 BRPM | SYSTOLIC BLOOD PRESSURE: 136 MMHG | HEART RATE: 62 BPM | BODY MASS INDEX: 35.16 KG/M2

## 2023-10-03 DIAGNOSIS — K21.9 CHRONIC GERD: ICD-10-CM

## 2023-10-03 DIAGNOSIS — E66.01 SEVERE OBESITY (BMI 35.0-39.9) WITH COMORBIDITY (HCC): ICD-10-CM

## 2023-10-03 DIAGNOSIS — E78.5 DYSLIPIDEMIA (HIGH LDL; LOW HDL): ICD-10-CM

## 2023-10-03 DIAGNOSIS — G47.33 OBSTRUCTIVE SLEEP APNEA, ADULT: ICD-10-CM

## 2023-10-03 DIAGNOSIS — Z98.84 S/P LAPAROSCOPIC SLEEVE GASTRECTOMY: Primary | ICD-10-CM

## 2023-10-03 PROCEDURE — 3075F SYST BP GE 130 - 139MM HG: CPT | Performed by: SURGERY

## 2023-10-03 PROCEDURE — 99214 OFFICE O/P EST MOD 30 MIN: CPT | Performed by: SURGERY

## 2023-10-03 PROCEDURE — 3079F DIAST BP 80-89 MM HG: CPT | Performed by: SURGERY

## 2023-10-03 NOTE — PATIENT INSTRUCTIONS
Patient received dietary handouts and education.     Goals:   - Track 6330-8431 kcal, 60-90 grams protein, 40-47 grams fat,  grams carb, 2000 mg sodium  - Take 2 MVI and 2 Calcium/day  - Work with "[employer]"

## 2023-10-03 NOTE — PROGRESS NOTES
Dietary Assessment Note      Vitals:   Vitals:    10/03/23 1154   BP: 136/86   Pulse: 62   Resp: 18   SpO2: 100%   Weight: 224 lb (101.6 kg)   Height: 5' 7\" (1.702 m)    Patient lost 14 lbs over 2 months. Total Weight Loss: 49 lbs    Labs reviewed: labs are reviewed, up to date and normal, no lab studies available for review at time of visit    Protein intake: 60-80 grams/day up to 90 grams/day    Fluid intake: >64 oz/day 80 oz water/day    Multivitamin/mineral intake:  [employer] Women's 50+ 1/day    Calcium intake: Fusion Calcium soft chews 1/day    Other: none    Exercise: no    Nutrition Assessment: 14 weeks post-op visit. Not focused on kcal, estimates under 1000, was limiting to 500 kcal some days trying to lose faster. Having 3 Protein shakes/day. Working 14 hour days, expects things to calm down in November. Breakfast: Premier Protein   Snack: None  Lunch: Premier Protein OR 12 grilled chix nuggets  Snack: Cucumbers OR fruit OR protein bar  Dinner: Soup (veggie) OR salad w/ grilled chix  Snack: Premier Protein     Amount able to eat per sitting: ~1 cup    Following 30/30/30 rule: Eating over 20-30 minutes. Waits 30 minutes between eating and drinking.     Food Intolerances/issues: higher fat foods/ foods from restaurants, sugary foods/cake    Client Concerns: weight loss slowing     Goals:   - Track 5199-3753 kcal, 60-90 grams protein, 40-47 grams fat,  grams carb, 2000 mg sodium  - Take 2 MVI and 2 Calcium/day  - Work with [employer]     Plan: Follow up at 6 months post op and as needed    [employer], RD, LD

## 2023-11-28 ENCOUNTER — HOSPITAL ENCOUNTER (OUTPATIENT)
Dept: WOMENS IMAGING | Age: 48
Discharge: HOME | End: 2023-11-28
Payer: COMMERCIAL

## 2023-11-28 VITALS — WEIGHT: 210 LBS | BODY MASS INDEX: 31.83 KG/M2 | HEIGHT: 68 IN

## 2023-11-28 DIAGNOSIS — Z12.31 SCREENING MAMMOGRAM FOR BREAST CANCER: ICD-10-CM

## 2023-11-28 PROCEDURE — 77063 BREAST TOMOSYNTHESIS BI: CPT

## 2023-12-05 ENCOUNTER — OFFICE VISIT (OUTPATIENT)
Dept: BARIATRICS/WEIGHT MGMT | Age: 48
End: 2023-12-05
Payer: COMMERCIAL

## 2023-12-05 VITALS
SYSTOLIC BLOOD PRESSURE: 126 MMHG | BODY MASS INDEX: 33.9 KG/M2 | WEIGHT: 216 LBS | OXYGEN SATURATION: 98 % | DIASTOLIC BLOOD PRESSURE: 78 MMHG | HEIGHT: 67 IN | RESPIRATION RATE: 18 BRPM | HEART RATE: 72 BPM

## 2023-12-05 DIAGNOSIS — G47.33 OBSTRUCTIVE SLEEP APNEA, ADULT: ICD-10-CM

## 2023-12-05 DIAGNOSIS — E78.5 DYSLIPIDEMIA (HIGH LDL; LOW HDL): ICD-10-CM

## 2023-12-05 DIAGNOSIS — K21.9 CHRONIC GERD: ICD-10-CM

## 2023-12-05 DIAGNOSIS — Z98.84 S/P LAPAROSCOPIC SLEEVE GASTRECTOMY: Primary | ICD-10-CM

## 2023-12-05 PROBLEM — I10 HTN (HYPERTENSION): Chronic | Status: RESOLVED | Noted: 2022-11-15 | Resolved: 2023-12-05

## 2023-12-05 PROCEDURE — 99214 OFFICE O/P EST MOD 30 MIN: CPT | Performed by: SURGERY

## 2023-12-05 NOTE — PROGRESS NOTES
Dietary Assessment Note      Vitals:   Vitals:    23 1203   BP: 126/78   Pulse: 72   Resp: 18   SpO2: 98%   Weight: 98 kg (216 lb)   Height: 1.702 m (5' 7\")    Patient lost 8 lbs over past 2 months. Total Weight Loss: 57 lbs    Labs reviewed: No new nutrition related labs      Protein intake: 60-80 grams/day     Fluid intake: 48-64 oz/day    Multivitamin/mineral intake: centrums womens 50+ 1-2 x day - sometimes makes her nauseous to get the second one in - tries to take with food on stomach     Calcium intake: Calcium chews 1 x day     Other: none     Exercise: Not currently exercising, plans to resume [employer] and start strength training     Nutrition Assessment: 6 mo s/p sleeve post-op visit.      Breakfast: protein granola bar     Snack: nothing     Lunch:  salad     Snack: apple     Dinner: smartones frozen meal     Snack: protein shake     Fluids: Drinking vitamin water zero     Amount able to eat per sittin cup     Following 30 rule: Yes    Food Intolerances/issues: nothing     Client Concerns: none     Goals:   Continue with diet   Start intentional exercise  Continue to take MVI with food on stomach to reach supplement goals     Plan: F/U per Provider     Mary Chua RD, LD
lifestyle to alleviate those co morbid conditions, otherwise risk deterioration.       - RTC in 2 months  - Nutrition labs         Patient advised that its their responsibility to follow up for care, studies, referrals and/or labs ordered today. Please note that some or all of this report was generated using voice recognition software. Please notify me in case of any questions about the content of this document, as some errors in transcription may have occurred .

## 2024-02-06 DIAGNOSIS — E78.5 DYSLIPIDEMIA (HIGH LDL; LOW HDL): ICD-10-CM

## 2024-02-06 DIAGNOSIS — Z98.84 S/P LAPAROSCOPIC SLEEVE GASTRECTOMY: ICD-10-CM

## 2024-02-06 DIAGNOSIS — G47.33 OBSTRUCTIVE SLEEP APNEA, ADULT: ICD-10-CM

## 2024-02-06 DIAGNOSIS — K21.9 CHRONIC GERD: ICD-10-CM

## 2024-02-06 LAB
25(OH)D3 SERPL-MCNC: 42.5 NG/ML
ALBUMIN SERPL-MCNC: 4.7 G/DL (ref 3.4–5)
ALBUMIN/GLOB SERPL: 2.6 {RATIO} (ref 1.1–2.2)
ALP SERPL-CCNC: 54 U/L (ref 40–129)
ALT SERPL-CCNC: 18 U/L (ref 10–40)
ANION GAP SERPL CALCULATED.3IONS-SCNC: 13 MMOL/L (ref 3–16)
AST SERPL-CCNC: 13 U/L (ref 15–37)
BASOPHILS # BLD: 0.1 K/UL (ref 0–0.2)
BASOPHILS NFR BLD: 1 %
BILIRUB SERPL-MCNC: 0.6 MG/DL (ref 0–1)
BUN SERPL-MCNC: 14 MG/DL (ref 7–20)
CALCIUM SERPL-MCNC: 9.8 MG/DL (ref 8.3–10.6)
CHLORIDE SERPL-SCNC: 105 MMOL/L (ref 99–110)
CHOLEST SERPL-MCNC: 141 MG/DL (ref 0–199)
CO2 SERPL-SCNC: 27 MMOL/L (ref 21–32)
CREAT SERPL-MCNC: 0.7 MG/DL (ref 0.6–1.1)
DEPRECATED RDW RBC AUTO: 12.8 % (ref 12.4–15.4)
EOSINOPHIL # BLD: 0.2 K/UL (ref 0–0.6)
EOSINOPHIL NFR BLD: 2.7 %
FOLATE SERPL-MCNC: 11.61 NG/ML (ref 4.78–24.2)
GFR SERPLBLD CREATININE-BSD FMLA CKD-EPI: >60 ML/MIN/{1.73_M2}
GLUCOSE SERPL-MCNC: 91 MG/DL (ref 70–99)
HCT VFR BLD AUTO: 43.8 % (ref 36–48)
HDLC SERPL-MCNC: 58 MG/DL (ref 40–60)
HGB BLD-MCNC: 15 G/DL (ref 12–16)
INR PPP: 0.95 (ref 0.84–1.16)
IRON SATN MFR SERPL: 17 % (ref 15–50)
IRON SERPL-MCNC: 55 UG/DL (ref 37–145)
LDLC SERPL CALC-MCNC: 72 MG/DL
LYMPHOCYTES # BLD: 1.9 K/UL (ref 1–5.1)
LYMPHOCYTES NFR BLD: 32.8 %
MCH RBC QN AUTO: 31.3 PG (ref 26–34)
MCHC RBC AUTO-ENTMCNC: 34.2 G/DL (ref 31–36)
MCV RBC AUTO: 91.5 FL (ref 80–100)
MONOCYTES # BLD: 0.4 K/UL (ref 0–1.3)
MONOCYTES NFR BLD: 7.9 %
NEUTROPHILS # BLD: 3.1 K/UL (ref 1.7–7.7)
NEUTROPHILS NFR BLD: 55.6 %
PLATELET # BLD AUTO: 238 K/UL (ref 135–450)
PMV BLD AUTO: 8.7 FL (ref 5–10.5)
POTASSIUM SERPL-SCNC: 4.7 MMOL/L (ref 3.5–5.1)
PROT SERPL-MCNC: 6.5 G/DL (ref 6.4–8.2)
PROTHROMBIN TIME: 12.7 SEC (ref 11.5–14.8)
RBC # BLD AUTO: 4.79 M/UL (ref 4–5.2)
SODIUM SERPL-SCNC: 145 MMOL/L (ref 136–145)
TIBC SERPL-MCNC: 330 UG/DL (ref 260–445)
TRIGL SERPL-MCNC: 54 MG/DL (ref 0–150)
TSH SERPL DL<=0.005 MIU/L-ACNC: 2.82 UIU/ML (ref 0.27–4.2)
VIT B12 SERPL-MCNC: 497 PG/ML (ref 211–911)
VLDLC SERPL CALC-MCNC: 11 MG/DL
WBC # BLD AUTO: 5.7 K/UL (ref 4–11)

## 2024-02-07 LAB
EST. AVERAGE GLUCOSE BLD GHB EST-MCNC: 85.3 MG/DL
HBA1C MFR BLD: 4.6 %

## 2024-02-09 LAB
A-TOCOPHEROL VIT E SERPL-MCNC: 8.5 MG/L (ref 5.5–18)
ANNOTATION COMMENT IMP: NORMAL
BETA+GAMMA TOCOPHEROL SERPL-MCNC: 1.7 MG/L (ref 0–6)
RETINYL PALMITATE SERPL-MCNC: 0.02 MG/L (ref 0–0.1)
VIT A SERPL-MCNC: 0.98 MG/L (ref 0.3–1.2)

## 2024-02-10 LAB — VIT B1 BLD-MCNC: 157 NMOL/L (ref 70–180)

## 2024-03-05 ENCOUNTER — OFFICE VISIT (OUTPATIENT)
Dept: BARIATRICS/WEIGHT MGMT | Age: 49
End: 2024-03-05
Payer: COMMERCIAL

## 2024-03-05 VITALS
BODY MASS INDEX: 33.43 KG/M2 | DIASTOLIC BLOOD PRESSURE: 89 MMHG | HEIGHT: 67 IN | OXYGEN SATURATION: 98 % | SYSTOLIC BLOOD PRESSURE: 176 MMHG | RESPIRATION RATE: 14 BRPM | WEIGHT: 213 LBS | HEART RATE: 77 BPM

## 2024-03-05 DIAGNOSIS — E78.5 DYSLIPIDEMIA (HIGH LDL; LOW HDL): ICD-10-CM

## 2024-03-05 DIAGNOSIS — Z98.84 S/P LAPAROSCOPIC SLEEVE GASTRECTOMY: Primary | ICD-10-CM

## 2024-03-05 PROCEDURE — 99214 OFFICE O/P EST MOD 30 MIN: CPT | Performed by: SURGERY

## 2024-03-05 NOTE — PATIENT INSTRUCTIONS
Patient received dietary handouts and education.    Goals:   - Take 2 MVI and 2 Calcium chews/day  - Eat 4 X/day including protein and plants  - Be consistent with exercise 2-3X/week

## 2024-03-05 NOTE — PROGRESS NOTES
Dietary Assessment Note      Vitals:   Vitals:    03/05/24 1204   BP: (!) 161/112   Site: Left Upper Arm   Position: Sitting   Pulse: 77   Resp: 14   SpO2: 98%   Weight: 96.6 kg (213 lb)   Height: 1.702 m (5' 7\")    Patient lost 3 lbs over  3 months.    Total Weight Loss: 60 lbs    Labs reviewed: labs are reviewed 2/6/24, up to date and normal    Protein intake: Patient not tracking, uses protein bar most days, feels she is below goal    Fluid intake: 48-64 oz/day vitamin water zero    Multivitamin/mineral intake: Centrums womens 50+ 1-2/day     Calcium intake: Calcium chews 1/day      Other: None    Exercise: HP strength training - inconsistent d/t illness     Nutrition Assessment: 8 months post-op visit.   Breakfast: 9 AM: Protein bar  Snack: None  Lunch: 1 PM: Broth with tons of veggies, sometimes w/ beans OR Salad w/ grilled chix or deli chix/HB egg/cheese/lite french dressing   Snack: Cutie orange/ Apple  Dinner: 7 PM: Out- 1 enchilada w/ cheese OR none d/t not feeling hungry   Snack: None  Bed around 10 PM, feels rested most of the time     Amount able to eat per sitting: ~1 cup, may be only veggies    Following 30/30/30 rule: Eating over 20-30 minutes. Waits 30 minutes between eating and drinking.    Food Intolerances/issues: none    Client Concerns: none   Handouts: pt declined MP    Goals:   - Take 2 MVI and 2 Calcium chews/day  - Eat 4 X/day including protein and plants  - Be consistent with exercise 2-3X/week    Plan: Follow up at 10 months post op and as needed    Neelima Bass, JEFFRY, LD    
ROS: negative    Physical Exam     Constitutional: Patient is oriented to person, place, and time. Patient appears well-developed and well-nourished. Patient is active and cooperative.  Non-toxic appearance. No distress.   HENT:   Head: Normocephalic and atraumatic. Head is without abrasion and without laceration. Hair is normal.   Right Ear: External ear normal. No lacerations. No drainage, swelling .   Left Ear: External ear normal. No lacerations. No drainage, swelling.   Nose/Mouth: no abrasions nor lesions.  Eyes: Conjunctivae, EOM and lids are normal. Right eye exhibits no discharge. No foreign body present in the right eye. Left eye exhibits no discharge. No foreign body present in the left eye. No scleral icterus.   Neck:No JVD present.   Pulmonary/Chest: Effort normal. No accessory muscle usage or stridor. No apnea. No respiratory distress.   Cardiovascular: Normal rate and no JVD.   Abdominal: Normal appearance. Patient exhibits no distension.    Musculoskeletal: Normal range of motion. Patient exhibits no edema.   Neurological: Patient is alert and oriented to person, place, and time.  Skin: Skin is warm and dry. No abrasion and no rash noted. Patient is not diaphoretic. No cyanosis or erythema.   Psychiatric: Patient has a normal mood and affect. Speech is normal and behavior is normal.       A/P:    Bhavya was seen today for follow-up.    Diagnoses and all orders for this visit:    S/P laparoscopic sleeve gastrectomy    Dyslipidemia (high LDL; low HDL)          Bhavya Turk is 49 y.o. female , now with Body mass index is 33.36 kg/m².  s/p Sleeve gastrectomy, has lost 3 lbs since last visit, total of 60 lbs weight loss. The patient underwent dietary counseling. I have reviewed, discussed and agree with the dietary plan by the registered dietitian. Patient is trying hard to keep good dietary and behavior modifications. Patient is monitoring portion sizes, food choices and liquid calories.  Patient is trying

## 2024-05-07 ENCOUNTER — OFFICE VISIT (OUTPATIENT)
Dept: BARIATRICS/WEIGHT MGMT | Age: 49
End: 2024-05-07
Payer: COMMERCIAL

## 2024-05-07 VITALS
BODY MASS INDEX: 33.59 KG/M2 | OXYGEN SATURATION: 97 % | SYSTOLIC BLOOD PRESSURE: 141 MMHG | HEIGHT: 67 IN | WEIGHT: 214 LBS | HEART RATE: 66 BPM | DIASTOLIC BLOOD PRESSURE: 95 MMHG

## 2024-05-07 DIAGNOSIS — E78.5 DYSLIPIDEMIA (HIGH LDL; LOW HDL): ICD-10-CM

## 2024-05-07 DIAGNOSIS — Z98.84 S/P LAPAROSCOPIC SLEEVE GASTRECTOMY: Primary | ICD-10-CM

## 2024-05-07 DIAGNOSIS — K21.9 CHRONIC GERD: ICD-10-CM

## 2024-05-07 PROCEDURE — 99214 OFFICE O/P EST MOD 30 MIN: CPT | Performed by: SURGERY

## 2024-05-07 NOTE — PROGRESS NOTES
Dietary Assessment Note      Vitals:   Vitals:    24 1206 24 1221   BP: (!) 155/90 (!) 141/95   Site: Left Upper Arm Right Upper Arm   Position: Sitting Sitting   Pulse: 66    SpO2: 97%    Weight: 97.1 kg (214 lb)    Height: 1.702 m (5' 7\")     Patient gained 1 lbs over 2 months.    Total Weight Loss: 59 lbs    Labs reviewed: labs are reviewed 24, up to date and normal    Protein intake: Patient not tracking    Fluid intake: >64 oz/day ~6 bottles/day vitamin water zero    Multivitamin/mineral intake: Centrums womens 50+ 2/day     Calcium intake: Calcium chews 1/day      Other: None    Exercise: ADL, walking when weather is nice    Nutrition Assessment: 10 months post-op visit.   Breakfast: 9 AM: Protein bar  Snack: None  Lunch: 1 PM: Salad w/ grilled chix or deli chix/HB egg/cheese/lite french dressing   Snack: Cutie orange/ Apple  Dinner: 7 PM: Grilled chix/salmon + sometimes veggie OR None d/t not feeling hungry  Snack: None  Bed around 10 PM, feels rested most of the time     Amount able to eat per sittin-8 oz meat, may not eat another component     Following 30/30/30 rule: Eating over 20-30 minutes. Waits 30 minutes between eating and drinking.    Food Intolerances/issues: none    Client Concerns: none     Goals:   - Eat 4 planned meals/day - avoid skipping meals  - Decrease protein to 4 oz per meal and and 1/2-1 cup veggies   - Aim for 30-60 minutes exercise 2-3X/week    Plan: Follow up at 1 year post op and as needed    Neelima Bass RD, LD  
  Head: Normocephalic and atraumatic. Head is without abrasion and without laceration. Hair is normal.   Right Ear: External ear normal. No lacerations. No drainage, swelling .   Left Ear: External ear normal. No lacerations. No drainage, swelling.   Nose/Mouth: no abrasions nor lesions.  Eyes: Conjunctivae, EOM and lids are normal. Right eye exhibits no discharge. No foreign body present in the right eye. Left eye exhibits no discharge. No foreign body present in the left eye. No scleral icterus.   Neck:No JVD present.   Pulmonary/Chest: Effort normal. No accessory muscle usage or stridor. No apnea. No respiratory distress.   Cardiovascular: Normal rate and no JVD.   Abdominal: Normal appearance. Patient exhibits no distension.    Musculoskeletal: Normal range of motion. Patient exhibits no edema.   Neurological: Patient is alert and oriented to person, place, and time.  Skin: Skin is warm and dry. No abrasion and no rash noted. Patient is not diaphoretic. No cyanosis or erythema.   Psychiatric: Patient has a normal mood and affect. Speech is normal and behavior is normal.       A/P:    Bhavya was seen today for follow-up.    Diagnoses and all orders for this visit:    S/P laparoscopic sleeve gastrectomy    Chronic GERD    Dyslipidemia (high LDL; low HDL)          Bhavya Turk is 49 y.o. female , now with Body mass index is 33.52 kg/m².  s/p Sleeve gastrectomy, has lost 0 lbs since last visit, total of 59 lbs weight loss. The patient underwent dietary counseling. I have reviewed, discussed and agree with the dietary plan by the registered dietitian. Patient is trying hard to keep good dietary and behavior modifications. Patient is monitoring portion sizes, food choices and liquid calories.  Patient is trying to exercise regularly. Patient pleased with the surgery outcomes.    I encouraged the patient to continue exercise and keeping healthy eating habits. Also counseled the patient extensively on post surgery care.

## 2024-05-07 NOTE — PATIENT INSTRUCTIONS
Patient received dietary handouts and education.    Goals:   - Eat 4 planned meals/day - avoid skipping meals  - Decrease protein to 4 oz per meal and and 1/2-1 cup veggies   - Aim for 30-60 minutes exercise 2-3X/week

## 2024-07-09 ENCOUNTER — OFFICE VISIT (OUTPATIENT)
Dept: BARIATRICS/WEIGHT MGMT | Age: 49
End: 2024-07-09
Payer: COMMERCIAL

## 2024-07-09 VITALS
HEART RATE: 73 BPM | BODY MASS INDEX: 33.59 KG/M2 | SYSTOLIC BLOOD PRESSURE: 171 MMHG | HEIGHT: 67 IN | WEIGHT: 214 LBS | OXYGEN SATURATION: 98 % | RESPIRATION RATE: 15 BRPM | DIASTOLIC BLOOD PRESSURE: 83 MMHG

## 2024-07-09 DIAGNOSIS — E78.5 DYSLIPIDEMIA (HIGH LDL; LOW HDL): ICD-10-CM

## 2024-07-09 DIAGNOSIS — Z98.84 S/P LAPAROSCOPIC SLEEVE GASTRECTOMY: Primary | ICD-10-CM

## 2024-07-09 PROBLEM — K21.9 CHRONIC GERD: Status: RESOLVED | Noted: 2023-02-24 | Resolved: 2024-07-09

## 2024-07-09 PROCEDURE — 99214 OFFICE O/P EST MOD 30 MIN: CPT | Performed by: SURGERY

## 2024-07-09 NOTE — PROGRESS NOTES
Dietary Assessment Note      Vitals:   Vitals:    24 1209   BP: (!) 152/103   Site: Left Upper Arm   Position: Sitting   Pulse: 73   Resp: 15   SpO2: 98%   Weight: 97.1 kg (214 lb)   Height: 1.702 m (5' 7\")    Patient lost 0 lbs over 2 months.    Total Weight Loss: 59 lbs    Labs reviewed: labs are reviewed, up to date and normal    Protein intake: 60-80 grams/day     Fluid intake: >64 oz/day 4 vitamin rios per day     Multivitamin/mineral intake: yes 2 [facility] [employer] OTC    Calcium intake: yes 2 chews per day     Other: none    Exercise: no - in a boot currently; walking 3-4 x per week for 40 minutes and laps in the pool for 3x per week for 30 minutes    Nutrition Assessment: 1 year post-op visit.   B: protein bar with fruit   S: none  L: salad with chicken and hard boiled egg and cheese, Ukrainian or Chick tricia a polynesian sauce  S: none OR protein shake OR Celsius energy diluted in vitamin water   D: skips OR can of cambells tomato soup OR salad OR steak and side salad     Amount able to eat per sittin-1.5 cups     Following  rule: yes     Food Intolerances/issues: none    Client Concerns: pt feels stuck at current weight.     Goals:   -Track calories and protein and aim for 1212-6301 calories and 60-80g protein and be more even over days eating       Plan: Follow up per provider    Indira Fermin RD   
distress.   HENT:   Head: Normocephalic and atraumatic. Head is without abrasion and without laceration. Hair is normal.   Right Ear: External ear normal. No lacerations. No drainage, swelling .   Left Ear: External ear normal. No lacerations. No drainage, swelling.   Nose/Mouth: no abrasions nor lesions.  Eyes: Conjunctivae, EOM and lids are normal. Right eye exhibits no discharge. No foreign body present in the right eye. Left eye exhibits no discharge. No foreign body present in the left eye. No scleral icterus.   Neck:No JVD present.   Pulmonary/Chest: Effort normal. No accessory muscle usage or stridor. No apnea. No respiratory distress.   Cardiovascular: Normal rate and no JVD.   Abdominal: Normal appearance. Patient exhibits no distension.    Musculoskeletal: Normal range of motion. Patient exhibits no edema.   Neurological: Patient is alert and oriented to person, place, and time.  Skin: Skin is warm and dry. No abrasion and no rash noted. Patient is not diaphoretic. No cyanosis or erythema.   Psychiatric: Patient has a normal mood and affect. Speech is normal and behavior is normal.       A/P:    Bhavya was seen today for follow-up.    Diagnoses and all orders for this visit:    S/P laparoscopic sleeve gastrectomy  -     CBC with Auto Differential; Future  -     Comprehensive Metabolic Panel; Future  -     Hemoglobin A1C; Future  -     Iron and TIBC; Future  -     Lipid Panel; Future  -     TSH with Reflex; Future  -     Vitamin A; Future  -     Vitamin B1, Whole Blood; Future  -     Vitamin B12 & Folate; Future  -     Vitamin D 25 Hydroxy; Future  -     Vitamin E; Future  -     Protime-INR; Future  -     Ambulatory Referral to Bariatric Surgery  -     EKG 12 Lead; Future    Dyslipidemia (high LDL; low HDL)  -     CBC with Auto Differential; Future  -     Comprehensive Metabolic Panel; Future  -     Hemoglobin A1C; Future  -     Iron and TIBC; Future  -     Lipid Panel; Future  -     TSH with Reflex; Future  -

## 2024-08-26 ENCOUNTER — HOSPITAL ENCOUNTER (OUTPATIENT)
Age: 49
Discharge: HOME | End: 2024-08-26
Payer: COMMERCIAL

## 2024-08-26 DIAGNOSIS — E78.5 DYSLIPIDEMIA (HIGH LDL; LOW HDL): ICD-10-CM

## 2024-08-26 DIAGNOSIS — Z98.84 S/P LAPAROSCOPIC SLEEVE GASTRECTOMY: ICD-10-CM

## 2024-08-26 LAB
25(OH)D3 SERPL-MCNC: 54.8 NG/ML
ALBUMIN SERPL-MCNC: 4.7 G/DL (ref 3.4–5)
ALBUMIN/GLOB SERPL: 2 {RATIO} (ref 1.1–2.2)
ALP SERPL-CCNC: 56 U/L (ref 40–129)
ALT SERPL-CCNC: 19 U/L (ref 10–40)
ANION GAP SERPL CALCULATED.3IONS-SCNC: 12 MMOL/L (ref 3–16)
AST SERPL-CCNC: 18 U/L (ref 15–37)
BASOPHILS # BLD: 0 K/UL (ref 0–0.2)
BASOPHILS NFR BLD: 0.7 %
BILIRUB SERPL-MCNC: 0.9 MG/DL (ref 0–1)
BUN SERPL-MCNC: 15 MG/DL (ref 7–20)
CALCIUM SERPL-MCNC: 9.8 MG/DL (ref 8.3–10.6)
CHLORIDE SERPL-SCNC: 104 MMOL/L (ref 99–110)
CHOLEST SERPL-MCNC: 180 MG/DL (ref 0–199)
CO2 SERPL-SCNC: 26 MMOL/L (ref 21–32)
CREAT SERPL-MCNC: 0.8 MG/DL (ref 0.6–1.1)
DEPRECATED RDW RBC AUTO: 12.8 % (ref 12.4–15.4)
EKG ATRIAL RATE: 62 BPM
EKG DIAGNOSIS: NORMAL
EKG P AXIS: 21 DEGREES
EKG P-R INTERVAL: 138 MS
EKG Q-T INTERVAL: 426 MS
EKG QRS DURATION: 104 MS
EKG QTC CALCULATION (BAZETT): 432 MS
EKG R AXIS: 10 DEGREES
EKG T AXIS: 28 DEGREES
EKG VENTRICULAR RATE: 62 BPM
EOSINOPHIL # BLD: 0.1 K/UL (ref 0–0.6)
EOSINOPHIL NFR BLD: 2.1 %
EST. AVERAGE GLUCOSE BLD GHB EST-MCNC: 79.6 MG/DL
FOLATE SERPL-MCNC: 13.6 NG/ML (ref 4.78–24.2)
GFR SERPLBLD CREATININE-BSD FMLA CKD-EPI: 89 ML/MIN/{1.73_M2}
GLUCOSE SERPL-MCNC: 95 MG/DL (ref 70–99)
HBA1C MFR BLD: 4.4 %
HCT VFR BLD AUTO: 44.3 % (ref 36–48)
HDLC SERPL-MCNC: 67 MG/DL (ref 40–60)
HGB BLD-MCNC: 15.5 G/DL (ref 12–16)
INR PPP: 0.96 (ref 0.85–1.15)
IRON SATN MFR SERPL: 55 % (ref 15–50)
IRON SERPL-MCNC: 188 UG/DL (ref 37–145)
LDLC SERPL CALC-MCNC: 97 MG/DL
LYMPHOCYTES # BLD: 1.3 K/UL (ref 1–5.1)
LYMPHOCYTES NFR BLD: 22.2 %
MCH RBC QN AUTO: 32.6 PG (ref 26–34)
MCHC RBC AUTO-ENTMCNC: 35 G/DL (ref 31–36)
MCV RBC AUTO: 93.1 FL (ref 80–100)
MONOCYTES # BLD: 0.5 K/UL (ref 0–1.3)
MONOCYTES NFR BLD: 7.7 %
NEUTROPHILS # BLD: 4.1 K/UL (ref 1.7–7.7)
NEUTROPHILS NFR BLD: 67.3 %
PLATELET # BLD AUTO: 228 K/UL (ref 135–450)
PMV BLD AUTO: 8.9 FL (ref 5–10.5)
POTASSIUM SERPL-SCNC: 4.7 MMOL/L (ref 3.5–5.1)
PROT SERPL-MCNC: 7 G/DL (ref 6.4–8.2)
PROTHROMBIN TIME: 13 SEC (ref 11.9–14.9)
RBC # BLD AUTO: 4.76 M/UL (ref 4–5.2)
SODIUM SERPL-SCNC: 142 MMOL/L (ref 136–145)
TIBC SERPL-MCNC: 344 UG/DL (ref 260–445)
TRIGL SERPL-MCNC: 78 MG/DL (ref 0–150)
TSH SERPL DL<=0.005 MIU/L-ACNC: 1.15 UIU/ML (ref 0.27–4.2)
VIT B12 SERPL-MCNC: 496 PG/ML (ref 211–911)
VLDLC SERPL CALC-MCNC: 16 MG/DL
WBC # BLD AUTO: 6 K/UL (ref 4–11)

## 2024-08-26 PROCEDURE — 36415 COLL VENOUS BLD VENIPUNCTURE: CPT

## 2024-08-26 PROCEDURE — 93010 ELECTROCARDIOGRAM REPORT: CPT | Performed by: INTERNAL MEDICINE

## 2024-08-26 PROCEDURE — 85610 PROTHROMBIN TIME: CPT

## 2024-08-26 PROCEDURE — 84425 ASSAY OF VITAMIN B-1: CPT

## 2024-08-26 PROCEDURE — 82306 VITAMIN D 25 HYDROXY: CPT

## 2024-08-26 PROCEDURE — 83550 IRON BINDING TEST: CPT

## 2024-08-26 PROCEDURE — 82607 VITAMIN B-12: CPT

## 2024-08-26 PROCEDURE — 80053 COMPREHEN METABOLIC PANEL: CPT

## 2024-08-26 PROCEDURE — 93005 ELECTROCARDIOGRAM TRACING: CPT

## 2024-08-26 PROCEDURE — 84443 ASSAY THYROID STIM HORMONE: CPT

## 2024-08-26 PROCEDURE — 83036 HEMOGLOBIN GLYCOSYLATED A1C: CPT

## 2024-08-26 PROCEDURE — 82746 ASSAY OF FOLIC ACID SERUM: CPT

## 2024-08-26 PROCEDURE — 80061 LIPID PANEL: CPT

## 2024-08-26 PROCEDURE — 83540 ASSAY OF IRON: CPT

## 2024-08-26 PROCEDURE — 85025 COMPLETE CBC W/AUTO DIFF WBC: CPT

## 2024-08-30 LAB — VIT B1 BLD-MCNC: 237 NMOL/L (ref 70–180)

## 2024-09-02 LAB
A-TOCOPHEROL VIT E SERPL-MCNC: 7.7 MG/L (ref 5.5–18)
ANNOTATION COMMENT IMP: NORMAL
BETA+GAMMA TOCOPHEROL SERPL-MCNC: 1.4 MG/L (ref 0–6)
RETINYL PALMITATE SERPL-MCNC: <0.02 MG/L (ref 0–0.1)
VIT A SERPL-MCNC: 0.77 MG/L (ref 0.3–1.2)

## 2024-09-11 ENCOUNTER — TELEMEDICINE (OUTPATIENT)
Dept: BARIATRICS/WEIGHT MGMT | Age: 49
End: 2024-09-11
Payer: COMMERCIAL

## 2024-09-11 ENCOUNTER — TELEPHONE (OUTPATIENT)
Dept: BARIATRICS/WEIGHT MGMT | Age: 49
End: 2024-09-11

## 2024-09-11 DIAGNOSIS — Z98.84 S/P LAPAROSCOPIC SLEEVE GASTRECTOMY: ICD-10-CM

## 2024-09-11 DIAGNOSIS — Z71.3 DIETARY COUNSELING AND SURVEILLANCE: ICD-10-CM

## 2024-09-11 DIAGNOSIS — E66.9 CLASS 1 OBESITY: Primary | ICD-10-CM

## 2024-09-11 PROCEDURE — G2211 COMPLEX E/M VISIT ADD ON: HCPCS | Performed by: FAMILY MEDICINE

## 2024-09-11 PROCEDURE — 99214 OFFICE O/P EST MOD 30 MIN: CPT | Performed by: FAMILY MEDICINE

## 2024-09-11 RX ORDER — SEMAGLUTIDE 0.25 MG/.5ML
0.25 INJECTION, SOLUTION SUBCUTANEOUS
Qty: 2 ML | Refills: 0 | Status: SHIPPED | OUTPATIENT
Start: 2024-09-11

## 2024-09-11 ASSESSMENT — ENCOUNTER SYMPTOMS
CHOKING: 0
APNEA: 0
CONSTIPATION: 0
PHOTOPHOBIA: 0
CHEST TIGHTNESS: 0
ABDOMINAL PAIN: 0
SHORTNESS OF BREATH: 0
DIARRHEA: 0
COUGH: 0
NAUSEA: 0
BLOOD IN STOOL: 0
WHEEZING: 0
ABDOMINAL DISTENTION: 0
EYE PAIN: 0
VOMITING: 0

## 2024-10-10 ENCOUNTER — TELEMEDICINE (OUTPATIENT)
Dept: BARIATRICS/WEIGHT MGMT | Age: 49
End: 2024-10-10
Payer: COMMERCIAL

## 2024-10-10 DIAGNOSIS — E66.811 CLASS 1 OBESITY: Primary | ICD-10-CM

## 2024-10-10 DIAGNOSIS — Z71.3 DIETARY COUNSELING AND SURVEILLANCE: ICD-10-CM

## 2024-10-10 PROCEDURE — 99214 OFFICE O/P EST MOD 30 MIN: CPT | Performed by: FAMILY MEDICINE

## 2024-10-10 PROCEDURE — G2211 COMPLEX E/M VISIT ADD ON: HCPCS | Performed by: FAMILY MEDICINE

## 2024-10-10 RX ORDER — SEMAGLUTIDE 0.5 MG/.5ML
0.5 INJECTION, SOLUTION SUBCUTANEOUS
Qty: 2 ML | Refills: 0 | Status: SHIPPED | OUTPATIENT
Start: 2024-10-10

## 2024-10-10 ASSESSMENT — ENCOUNTER SYMPTOMS
SHORTNESS OF BREATH: 0
PHOTOPHOBIA: 0
EYE PAIN: 0
DIARRHEA: 0
BLOOD IN STOOL: 0
CONSTIPATION: 0
APNEA: 0
CHEST TIGHTNESS: 0
ABDOMINAL PAIN: 0
WHEEZING: 0
COUGH: 0
ABDOMINAL DISTENTION: 0
CHOKING: 0
NAUSEA: 0
VOMITING: 0

## 2024-10-10 NOTE — PROGRESS NOTES
12.8  12.4 - 15.4 % Final    Platelets 08/26/2024 228  135 - 450 K/uL Final    MPV 08/26/2024 8.9  5.0 - 10.5 fL Final    Neutrophils % 08/26/2024 67.3  % Final    Lymphocytes % 08/26/2024 22.2  % Final    Monocytes % 08/26/2024 7.7  % Final    Eosinophils % 08/26/2024 2.1  % Final    Basophils % 08/26/2024 0.7  % Final    Neutrophils Absolute 08/26/2024 4.1  1.7 - 7.7 K/uL Final    Lymphocytes Absolute 08/26/2024 1.3  1.0 - 5.1 K/uL Final    Monocytes Absolute 08/26/2024 0.5  0.0 - 1.3 K/uL Final    Eosinophils Absolute 08/26/2024 0.1  0.0 - 0.6 K/uL Final    Basophils Absolute 08/26/2024 0.0  0.0 - 0.2 K/uL Final         Assessment and Plan:  1. Class 1 obesity  Improving, not at goal.  Continue low carb/arie diet.  Increase physical activity.   Increase Wegovy to 0.5 mg SC weekly.   F/u 4 weeks.   In-office weight check prior to next visit.     2. Dietary counseling and surveillance  Low carb/arie diet.   Protein with every meal and snack.   Avoid skipping meals.          Nutrition Plan: [] LCHF/Ketogenic   [x] Modified low-calorie diet (low carb/low-arie)               [] Low-calorie diet    [] Maintenance       []Other        Exercise: [x] Cardio     [x] Resistance/strength training                       [x] ACSM recommendations (150 minutes/week in active weight loss)               Behavior: [x] Motivational interviewing performed    [] Referral for counseling                         [x] Discussed strategies to overcome habits/challenges for focus         [] Stress management   [x] Stimulus control         [] Sleep hygiene      Reviewed:  [x] Nutrition and the importance of regular protein intake  [x] Hidden carbohydrate sources  [x] Importance of exercise and reducing sedentary time  [x] Treatment consent- Patient understands and agrees with the treatment plan   [x] Proper use of medication and side effects      Treatment start date: 9/12/24  Starting weight: 214 pounds (home scale)    Total weight loss: 4

## 2024-11-07 ENCOUNTER — TELEMEDICINE (OUTPATIENT)
Dept: BARIATRICS/WEIGHT MGMT | Age: 49
End: 2024-11-07

## 2024-11-07 DIAGNOSIS — Z71.3 DIETARY COUNSELING AND SURVEILLANCE: ICD-10-CM

## 2024-11-07 DIAGNOSIS — E66.811 CLASS 1 OBESITY: Primary | ICD-10-CM

## 2024-11-07 RX ORDER — SEMAGLUTIDE 1 MG/.5ML
1 INJECTION, SOLUTION SUBCUTANEOUS
Qty: 2 ML | Refills: 0 | Status: SHIPPED | OUTPATIENT
Start: 2024-11-07 | End: 2024-12-05

## 2024-11-07 NOTE — PROGRESS NOTES
Patient: Bhavya Turk                      Encounter Date: 11/7/2024    YOB: 1975                Age: 49 y.o.    Chief Complaint   Patient presents with    Weight Management     F/u MWM         Patient identification was verified at the start of the visit.         11/7/2024    12:23 PM   Patient-Reported Vitals   Patient-Reported Weight 212   Patient-Reported Height 5’7”         BP Readings from Last 1 Encounters:   07/09/24 (!) 171/83       BMI Readings from Last 1 Encounters:   07/09/24 33.52 kg/m²       Pulse Readings from Last 1 Encounters:   07/09/24 73          Wt Readings from Last 3 Encounters:   07/09/24 97.1 kg (214 lb)   05/07/24 97.1 kg (214 lb)   03/05/24 96.6 kg (213 lb)           HPI: 49 y.o. female with a long-standing history of obesity s/p sleeve gastrectomy in June 2023 presents today for virtual video follow-up. She has lost 4 pounds since her last visit. Current treatment includes Wegovy 0.5 mg SC weekly and low carb/arie diet. No exercise. Tolerating it well. Making better dietary choices. Motivated to continue losing weight.      Medication(s): Appetite well controlled?     [x]Yes      []No                          Focus:     [x]Good     []Fair     []Poor                          Side effects?  Mild nausea         Any recent change in medication(s)? No        Allergies   Allergen Reactions    Morphine Itching    Cephalexin Diarrhea and Nausea And Vomiting         Current Outpatient Medications:     Semaglutide-Weight Management (WEGOVY) 1.7 MG/0.75ML SOAJ SC injection, Inject 1.7 mg into the skin every 7 days, Disp: 3 mL, Rfl: 0    omeprazole (PRILOSEC) 20 MG delayed release capsule, TAKE ONE CAPSULE BY MOUTH EVERY MORNING BEFORE BREAKFAST, Disp: 90 capsule, Rfl: 1    buPROPion (WELLBUTRIN SR) 150 MG extended release tablet, TAKE ONE TABLET BY MOUTH TWICE A DAY, Disp: 60 tablet, Rfl: 5    Patient Active Problem List   Diagnosis    Menorrhagia with regular cycle    Fibroid uterus

## 2024-12-05 ENCOUNTER — TELEMEDICINE (OUTPATIENT)
Dept: BARIATRICS/WEIGHT MGMT | Age: 49
End: 2024-12-05
Payer: COMMERCIAL

## 2024-12-05 DIAGNOSIS — Z71.3 DIETARY COUNSELING AND SURVEILLANCE: ICD-10-CM

## 2024-12-05 DIAGNOSIS — E66.811 CLASS 1 OBESITY: Primary | ICD-10-CM

## 2024-12-05 PROCEDURE — G2211 COMPLEX E/M VISIT ADD ON: HCPCS | Performed by: FAMILY MEDICINE

## 2024-12-05 PROCEDURE — 99214 OFFICE O/P EST MOD 30 MIN: CPT | Performed by: FAMILY MEDICINE

## 2024-12-05 RX ORDER — SEMAGLUTIDE 1.7 MG/.75ML
1.7 INJECTION, SOLUTION SUBCUTANEOUS
Qty: 3 ML | Refills: 0 | Status: SHIPPED | OUTPATIENT
Start: 2024-12-05 | End: 2024-12-10 | Stop reason: SDUPTHER

## 2024-12-05 NOTE — PROGRESS NOTES
Patient: Bhavya Turk                      Encounter Date: 12/5/2024    YOB: 1975                Age: 49 y.o.    Chief Complaint   Patient presents with    Weight Management     F/u MWM         Patient identification was verified at the start of the visit.         11/7/2024    12:23 PM   Patient-Reported Vitals   Patient-Reported Weight 212   Patient-Reported Height 5’7”         BP Readings from Last 1 Encounters:   07/09/24 (!) 171/83       BMI Readings from Last 1 Encounters:   07/09/24 33.52 kg/m²       Pulse Readings from Last 1 Encounters:   07/09/24 73          Wt Readings from Last 3 Encounters:   07/09/24 97.1 kg (214 lb)   05/07/24 97.1 kg (214 lb)   03/05/24 96.6 kg (213 lb)        HPI: 49 y.o. female with a long-standing history of obesity s/p sleeve gastrectomy in June 2023 presents today for virtual video follow-up. She has not weighed herself recently. Current treatment includes Wegovy 1 mg SC weekly and low carb/arie diet. No issues. Feels she is losing weight. Considering switching to compounded Semaglutide due to cost. Would like another refill until she switches to compounded meds.      Medication(s): Appetite well controlled?     [x]Yes      []No                          Focus:     [x]Good     []Fair     []Poor                          Side effects?  No        Any recent change in medication(s)? No         Allergies   Allergen Reactions    Morphine Itching    Cephalexin Diarrhea and Nausea And Vomiting         Current Outpatient Medications:     Semaglutide-Weight Management (WEGOVY) 1.7 MG/0.75ML SOAJ SC injection, Inject 1.7 mg into the skin every 7 days, Disp: 3 mL, Rfl: 0    omeprazole (PRILOSEC) 20 MG delayed release capsule, TAKE ONE CAPSULE BY MOUTH EVERY MORNING BEFORE BREAKFAST, Disp: 90 capsule, Rfl: 1    buPROPion (WELLBUTRIN SR) 150 MG extended release tablet, TAKE ONE TABLET BY MOUTH TWICE A DAY, Disp: 60 tablet, Rfl: 5    Patient Active Problem List   Diagnosis

## 2024-12-10 DIAGNOSIS — E66.811 CLASS 1 OBESITY: Primary | ICD-10-CM

## 2024-12-10 RX ORDER — SEMAGLUTIDE 1.7 MG/.75ML
1.7 INJECTION, SOLUTION SUBCUTANEOUS
Qty: 3 ML | Refills: 0 | Status: SHIPPED | OUTPATIENT
Start: 2024-12-10

## 2024-12-12 ENCOUNTER — HOSPITAL ENCOUNTER (OUTPATIENT)
Dept: WOMENS IMAGING | Age: 49
Discharge: HOME | End: 2024-12-12
Payer: COMMERCIAL

## 2024-12-12 VITALS — BODY MASS INDEX: 30.62 KG/M2 | WEIGHT: 202 LBS | HEIGHT: 68 IN

## 2024-12-12 DIAGNOSIS — Z12.31 VISIT FOR SCREENING MAMMOGRAM: ICD-10-CM

## 2024-12-12 PROCEDURE — 77063 BREAST TOMOSYNTHESIS BI: CPT

## 2024-12-23 ENCOUNTER — OFFICE VISIT (OUTPATIENT)
Dept: INTERNAL MEDICINE CLINIC | Age: 49
End: 2024-12-23
Payer: COMMERCIAL

## 2024-12-23 VITALS
DIASTOLIC BLOOD PRESSURE: 80 MMHG | HEART RATE: 85 BPM | WEIGHT: 202 LBS | HEIGHT: 68 IN | OXYGEN SATURATION: 99 % | BODY MASS INDEX: 30.62 KG/M2 | SYSTOLIC BLOOD PRESSURE: 138 MMHG

## 2024-12-23 DIAGNOSIS — Z12.11 COLON CANCER SCREENING: ICD-10-CM

## 2024-12-23 DIAGNOSIS — E66.813 CLASS 3 SEVERE OBESITY DUE TO EXCESS CALORIES WITHOUT SERIOUS COMORBIDITY WITH BODY MASS INDEX (BMI) OF 40.0 TO 44.9 IN ADULT: ICD-10-CM

## 2024-12-23 DIAGNOSIS — I10 PRIMARY HYPERTENSION: ICD-10-CM

## 2024-12-23 DIAGNOSIS — E66.01 CLASS 3 SEVERE OBESITY DUE TO EXCESS CALORIES WITHOUT SERIOUS COMORBIDITY WITH BODY MASS INDEX (BMI) OF 40.0 TO 44.9 IN ADULT: ICD-10-CM

## 2024-12-23 DIAGNOSIS — Z00.00 ANNUAL PHYSICAL EXAM: Primary | ICD-10-CM

## 2024-12-23 PROCEDURE — 3075F SYST BP GE 130 - 139MM HG: CPT | Performed by: NURSE PRACTITIONER

## 2024-12-23 PROCEDURE — 99396 PREV VISIT EST AGE 40-64: CPT | Performed by: NURSE PRACTITIONER

## 2024-12-23 PROCEDURE — 3079F DIAST BP 80-89 MM HG: CPT | Performed by: NURSE PRACTITIONER

## 2024-12-23 RX ORDER — AMLODIPINE BESYLATE 5 MG/1
5 TABLET ORAL DAILY
Qty: 90 TABLET | Refills: 3 | Status: SHIPPED | OUTPATIENT
Start: 2024-12-23

## 2024-12-23 SDOH — ECONOMIC STABILITY: FOOD INSECURITY: WITHIN THE PAST 12 MONTHS, THE FOOD YOU BOUGHT JUST DIDN'T LAST AND YOU DIDN'T HAVE MONEY TO GET MORE.: NEVER TRUE

## 2024-12-23 SDOH — ECONOMIC STABILITY: FOOD INSECURITY: WITHIN THE PAST 12 MONTHS, YOU WORRIED THAT YOUR FOOD WOULD RUN OUT BEFORE YOU GOT MONEY TO BUY MORE.: NEVER TRUE

## 2024-12-23 SDOH — ECONOMIC STABILITY: INCOME INSECURITY: HOW HARD IS IT FOR YOU TO PAY FOR THE VERY BASICS LIKE FOOD, HOUSING, MEDICAL CARE, AND HEATING?: NOT HARD AT ALL

## 2024-12-23 ASSESSMENT — ENCOUNTER SYMPTOMS
GASTROINTESTINAL NEGATIVE: 1
RESPIRATORY NEGATIVE: 1

## 2024-12-23 ASSESSMENT — PATIENT HEALTH QUESTIONNAIRE - PHQ9
6. FEELING BAD ABOUT YOURSELF - OR THAT YOU ARE A FAILURE OR HAVE LET YOURSELF OR YOUR FAMILY DOWN: NOT AT ALL
SUM OF ALL RESPONSES TO PHQ QUESTIONS 1-9: 0
SUM OF ALL RESPONSES TO PHQ9 QUESTIONS 1 & 2: 0
9. THOUGHTS THAT YOU WOULD BE BETTER OFF DEAD, OR OF HURTING YOURSELF: NOT AT ALL
SUM OF ALL RESPONSES TO PHQ QUESTIONS 1-9: 0
SUM OF ALL RESPONSES TO PHQ QUESTIONS 1-9: 0
8. MOVING OR SPEAKING SO SLOWLY THAT OTHER PEOPLE COULD HAVE NOTICED. OR THE OPPOSITE, BEING SO FIGETY OR RESTLESS THAT YOU HAVE BEEN MOVING AROUND A LOT MORE THAN USUAL: NOT AT ALL
4. FEELING TIRED OR HAVING LITTLE ENERGY: NOT AT ALL
7. TROUBLE CONCENTRATING ON THINGS, SUCH AS READING THE NEWSPAPER OR WATCHING TELEVISION: NOT AT ALL
3. TROUBLE FALLING OR STAYING ASLEEP: NOT AT ALL
10. IF YOU CHECKED OFF ANY PROBLEMS, HOW DIFFICULT HAVE THESE PROBLEMS MADE IT FOR YOU TO DO YOUR WORK, TAKE CARE OF THINGS AT HOME, OR GET ALONG WITH OTHER PEOPLE: NOT DIFFICULT AT ALL
2. FEELING DOWN, DEPRESSED OR HOPELESS: NOT AT ALL
SUM OF ALL RESPONSES TO PHQ QUESTIONS 1-9: 0
1. LITTLE INTEREST OR PLEASURE IN DOING THINGS: NOT AT ALL
5. POOR APPETITE OR OVEREATING: NOT AT ALL

## 2024-12-23 NOTE — PROGRESS NOTES
SUBJECTIVE:    Patient ID: Bhavya Turk is a 49 y.o. female.    CC: Annual exam, obesity, hypertension    HPI: The patient presents to the office today for annual physical examination and follow-up of chronic medical conditions.    Patient has noted her blood pressure is elevated at most doctors office visits.  She does not check this at home.  Her blood pressure is high normal here.  She was previously taking amlodipine but stopped this medication as she no longer felt she needed after weight loss surgery.  She would like to resume this treatment.    She has been under the care of of bariatrics for obesity.  They have her on Wegovy.  She is contemplative of switching to compounded given the cost.    Father-in-law recently diagnosed with colon cancer so this has prompted her to obtain colon cancer screening.  She would like to do a colonoscopy.      Past Medical History:   Diagnosis Date    Cancer (HCC)     skin    Chronic GERD 02/24/2023    Fibroid uterus     HTN (hypertension) 11/15/2022    Morbid obesity with BMI of 40.0-44.9, adult     Obsessive compulsive disorder     Obstructive sleep apnea, adult     Pre-operative clearance     Sleep apnea         Current Outpatient Medications   Medication Sig Dispense Refill    amLODIPine (NORVASC) 5 MG tablet Take 1 tablet by mouth daily 90 tablet 3    Semaglutide-Weight Management (WEGOVY) 1.7 MG/0.75ML SOAJ SC injection Inject 1.7 mg into the skin every 7 days 3 mL 0    omeprazole (PRILOSEC) 20 MG delayed release capsule TAKE ONE CAPSULE BY MOUTH EVERY MORNING BEFORE BREAKFAST 90 capsule 1     No current facility-administered medications for this visit.           Review of Systems   Constitutional: Negative.    Respiratory: Negative.     Cardiovascular: Negative.    Gastrointestinal: Negative.    Genitourinary: Negative.    Musculoskeletal: Negative.    Neurological: Negative.    Psychiatric/Behavioral: Negative.           OBJECTIVE:  Physical Exam  Vitals reviewed.

## 2024-12-30 ENCOUNTER — TELEPHONE (OUTPATIENT)
Dept: BARIATRICS/WEIGHT MGMT | Age: 49
End: 2024-12-30

## 2024-12-30 NOTE — TELEPHONE ENCOUNTER
Pt called to set appt with Dr De La Paz set it for 1/24 but needs medication refill before this advised we would call back.

## 2024-12-30 NOTE — TELEPHONE ENCOUNTER
LVM for patient to contact the office ASAP for sooner appt. Advised appt can not be held and may not be available if calls back too late. Refills are only obtained within a virtual appointment. Pt was originally called and Mychart Msg sent 12/5/24 to schedule an appt

## 2025-01-06 ENCOUNTER — TELEMEDICINE (OUTPATIENT)
Dept: BARIATRICS/WEIGHT MGMT | Age: 50
End: 2025-01-06

## 2025-01-06 ENCOUNTER — TELEPHONE (OUTPATIENT)
Dept: BARIATRICS/WEIGHT MGMT | Age: 50
End: 2025-01-06

## 2025-01-06 DIAGNOSIS — Z71.3 DIETARY COUNSELING AND SURVEILLANCE: ICD-10-CM

## 2025-01-06 DIAGNOSIS — E66.811 CLASS 1 OBESITY: Primary | ICD-10-CM

## 2025-01-06 RX ORDER — SEMAGLUTIDE 2.4 MG/.75ML
2.4 INJECTION, SOLUTION SUBCUTANEOUS
Qty: 3 ML | Refills: 0 | Status: SHIPPED | OUTPATIENT
Start: 2025-01-06 | End: 2025-01-07 | Stop reason: SDUPTHER

## 2025-01-06 ASSESSMENT — ENCOUNTER SYMPTOMS
VOMITING: 0
BLOOD IN STOOL: 0
PHOTOPHOBIA: 0
COUGH: 0
CONSTIPATION: 0
CHEST TIGHTNESS: 0
WHEEZING: 0
EYE PAIN: 0
SHORTNESS OF BREATH: 0
DIARRHEA: 0
ABDOMINAL PAIN: 0
NAUSEA: 0
ABDOMINAL DISTENTION: 0
CHOKING: 0
APNEA: 0

## 2025-01-06 NOTE — PROGRESS NOTES
Patient: Bhavya Turk                      Encounter Date: 1/6/2025    YOB: 1975                Age: 49 y.o.    Chief Complaint   Patient presents with    Weight Management     F/u MWM          Patient identification was verified at the start of the visit.         1/6/2025     3:24 PM   Patient-Reported Vitals   Patient-Reported Weight 199   Patient-Reported Height 5’7”         BP Readings from Last 1 Encounters:   12/23/24 138/80       BMI Readings from Last 1 Encounters:   12/23/24 30.71 kg/m²       Pulse Readings from Last 1 Encounters:   12/23/24 85          Wt Readings from Last 3 Encounters:   12/23/24 91.6 kg (202 lb)   12/12/24 91.6 kg (202 lb)   07/09/24 97.1 kg (214 lb)        HPI: 49 y.o. female with a long-standing history of obesity s/p sleeve gastrectomy in June 2023 presents today for virtual video follow-up. She has lost 15 pounds since September. Current treatment includes Wegovy 1.7 mg SC weekly and low carb/arie diet.     Medication(s): Appetite well controlled?     [x]Yes      []No                          Focus:     [x]Good     []Fair     []Poor                          Side effects?  Occasional nausea       Any recent change in medication(s)? No        Allergies   Allergen Reactions    Morphine Itching    Cephalexin Diarrhea and Nausea And Vomiting         Current Outpatient Medications:     Semaglutide-Weight Management (WEGOVY) 2.4 MG/0.75ML SOAJ SC injection, Inject 2.4 mg into the skin every 7 days, Disp: 3 mL, Rfl: 0    amLODIPine (NORVASC) 5 MG tablet, Take 1 tablet by mouth daily, Disp: 90 tablet, Rfl: 3    omeprazole (PRILOSEC) 20 MG delayed release capsule, TAKE ONE CAPSULE BY MOUTH EVERY MORNING BEFORE BREAKFAST, Disp: 90 capsule, Rfl: 1    Patient Active Problem List   Diagnosis    Menorrhagia with regular cycle    Fibroid uterus    Adnexal mass    Chronic pelvic pain in female    Sleep apnea    Mild episode of recurrent major depressive disorder (HCC)    Obstructive

## 2025-01-07 RX ORDER — SEMAGLUTIDE 2.4 MG/.75ML
2.4 INJECTION, SOLUTION SUBCUTANEOUS
Qty: 3 ML | Refills: 0 | Status: SHIPPED | OUTPATIENT
Start: 2025-01-07

## 2025-01-07 NOTE — TELEPHONE ENCOUNTER
Submitted PA for Wegovy 2.4MG/0.75ML auto-injectors  [facility]ia [facility] [id number] STATUS: PENDING.    Follow up done daily; if no decision with in three days we will refax.  If another three days goes by with no decision will call the insurance for status.

## 2025-01-29 ENCOUNTER — TELEMEDICINE (OUTPATIENT)
Dept: BARIATRICS/WEIGHT MGMT | Age: 50
End: 2025-01-29
Payer: COMMERCIAL

## 2025-01-29 DIAGNOSIS — Z71.3 DIETARY COUNSELING AND SURVEILLANCE: ICD-10-CM

## 2025-01-29 DIAGNOSIS — E66.811 CLASS 1 OBESITY: Primary | ICD-10-CM

## 2025-01-29 PROCEDURE — G2211 COMPLEX E/M VISIT ADD ON: HCPCS | Performed by: FAMILY MEDICINE

## 2025-01-29 PROCEDURE — 99214 OFFICE O/P EST MOD 30 MIN: CPT | Performed by: FAMILY MEDICINE

## 2025-01-29 ASSESSMENT — ENCOUNTER SYMPTOMS
ABDOMINAL PAIN: 0
DIARRHEA: 0
CHOKING: 0
BLOOD IN STOOL: 0
CHEST TIGHTNESS: 0
WHEEZING: 0
NAUSEA: 0
APNEA: 0
CONSTIPATION: 0
SHORTNESS OF BREATH: 0
COUGH: 0
VOMITING: 0
EYE PAIN: 0
PHOTOPHOBIA: 0
ABDOMINAL DISTENTION: 0

## 2025-01-29 NOTE — PROGRESS NOTES
Patient: Bhavya Turk                      Encounter Date: 1/29/2025    YOB: 1975                Age: 49 y.o.    Chief Complaint   Patient presents with    Weight Management     F/u MWM          Patient identification was verified at the start of the visit.         1/6/2025     3:24 PM   Patient-Reported Vitals   Patient-Reported Weight 199   Patient-Reported Height 5’7”         BP Readings from Last 1 Encounters:   12/23/24 138/80       BMI Readings from Last 1 Encounters:   12/23/24 30.71 kg/m²       Pulse Readings from Last 1 Encounters:   12/23/24 85          Wt Readings from Last 3 Encounters:   12/23/24 91.6 kg (202 lb)   12/12/24 91.6 kg (202 lb)   07/09/24 97.1 kg (214 lb)      HPI: 49 y.o. female with a long-standing history of obesity s/p sleeve gastrectomy in June 2023 presents today for virtual video follow-up. She has not checked her weight since her last visit. Current treatment includes Wegovy 2.4 mg SC weekly and low carb/arie diet. Continuing to be mindful of food choices and portion sizes. Physical activity has been limited due to recent illness.      Medication(s): Appetite well controlled?     [x]Yes      []No                          Focus:     [x]Good     []Fair     []Poor                          Side effects?  No     Any recent change in medication(s)? No        Allergies   Allergen Reactions    Morphine Itching    Cephalexin Diarrhea and Nausea And Vomiting         Current Outpatient Medications:     Semaglutide-Weight Management (WEGOVY) 2.4 MG/0.75ML SOAJ SC injection, Inject 2.4 mg into the skin every 7 days, Disp: 3 mL, Rfl: 2    amLODIPine (NORVASC) 5 MG tablet, Take 1 tablet by mouth daily, Disp: 90 tablet, Rfl: 3    omeprazole (PRILOSEC) 20 MG delayed release capsule, TAKE ONE CAPSULE BY MOUTH EVERY MORNING BEFORE BREAKFAST, Disp: 90 capsule, Rfl: 1    Patient Active Problem List   Diagnosis    Menorrhagia with regular cycle    Fibroid uterus    Adnexal mass    Chronic

## 2025-04-29 ENCOUNTER — TELEMEDICINE (OUTPATIENT)
Dept: BARIATRICS/WEIGHT MGMT | Age: 50
End: 2025-04-29
Payer: COMMERCIAL

## 2025-04-29 DIAGNOSIS — Z71.3 DIETARY COUNSELING AND SURVEILLANCE: ICD-10-CM

## 2025-04-29 DIAGNOSIS — E66.811 CLASS 1 OBESITY: Primary | ICD-10-CM

## 2025-04-29 PROCEDURE — G2211 COMPLEX E/M VISIT ADD ON: HCPCS | Performed by: FAMILY MEDICINE

## 2025-04-29 PROCEDURE — 99214 OFFICE O/P EST MOD 30 MIN: CPT | Performed by: FAMILY MEDICINE

## 2025-04-29 ASSESSMENT — ENCOUNTER SYMPTOMS
VOMITING: 0
NAUSEA: 0
APNEA: 0
SHORTNESS OF BREATH: 0
WHEEZING: 0
ABDOMINAL DISTENTION: 0
COUGH: 0
ABDOMINAL PAIN: 0
BLOOD IN STOOL: 0
DIARRHEA: 0
CHOKING: 0
EYE PAIN: 0
PHOTOPHOBIA: 0
CHEST TIGHTNESS: 0
CONSTIPATION: 0

## 2025-04-29 NOTE — PROGRESS NOTES
of the following organ systems was limited: Vitals/Constitutional/EENT/Resp/CV/GI//MS/Neuro/Skin/Heme-Lymph-Imm.      Bhavya Turk, was evaluated through a synchronous (real-time) audio-video encounter. The patient (or guardian if applicable) is aware that this is a billable service, which includes applicable co-pays. This Virtual Visit was conducted with patient's (and/or legal guardian's) consent. Patient identification was verified, and a caregiver was present when appropriate.   The patient was located at Other: [place]  Provider was located at Home (Appt Dept State): [place]  Confirm you are appropriately licensed, registered, or certified to deliver care in the state where the patient is located as indicated above. If you are not or unsure, please re-schedule the visit: Yes, I confirm.        Total time spent for this encounter: Not billed by time    --Santos De La Paz MD on 4/29/2025 at 10:41 AM    An electronic signature was used to authenticate this note.

## 2025-06-25 DIAGNOSIS — E66.811 CLASS 1 OBESITY: ICD-10-CM

## 2025-06-25 LAB
25(OH)D3 SERPL-MCNC: 47.9 NG/ML
ALBUMIN SERPL-MCNC: 4.6 G/DL (ref 3.4–5)
ALBUMIN/GLOB SERPL: 2.7 {RATIO} (ref 1.1–2.2)
ALP SERPL-CCNC: 48 U/L (ref 40–129)
ALT SERPL-CCNC: 23 U/L (ref 10–40)
ANION GAP SERPL CALCULATED.3IONS-SCNC: 11 MMOL/L (ref 3–16)
AST SERPL-CCNC: 16 U/L (ref 15–37)
BILIRUB SERPL-MCNC: 1 MG/DL (ref 0–1)
BUN SERPL-MCNC: 9 MG/DL (ref 7–20)
CALCIUM SERPL-MCNC: 9.7 MG/DL (ref 8.3–10.6)
CHLORIDE SERPL-SCNC: 106 MMOL/L (ref 99–110)
CHOLEST SERPL-MCNC: 140 MG/DL (ref 0–199)
CO2 SERPL-SCNC: 26 MMOL/L (ref 21–32)
CREAT SERPL-MCNC: 0.8 MG/DL (ref 0.6–1.1)
EST. AVERAGE GLUCOSE BLD GHB EST-MCNC: 76.7 MG/DL
FOLATE SERPL-MCNC: 11.7 NG/ML (ref 4.78–24.2)
GFR SERPLBLD CREATININE-BSD FMLA CKD-EPI: 88 ML/MIN/{1.73_M2}
GLUCOSE SERPL-MCNC: 86 MG/DL (ref 70–99)
HBA1C MFR BLD: 4.3 %
HDLC SERPL-MCNC: 61 MG/DL (ref 40–60)
LDLC SERPL CALC-MCNC: 64 MG/DL
LIPASE SERPL-CCNC: 41 U/L (ref 13–60)
POTASSIUM SERPL-SCNC: 3.9 MMOL/L (ref 3.5–5.1)
PROT SERPL-MCNC: 6.3 G/DL (ref 6.4–8.2)
SODIUM SERPL-SCNC: 143 MMOL/L (ref 136–145)
TRIGL SERPL-MCNC: 76 MG/DL (ref 0–150)
TSH SERPL DL<=0.005 MIU/L-ACNC: 2.07 UIU/ML (ref 0.27–4.2)
VIT B12 SERPL-MCNC: 445 PG/ML (ref 211–911)
VLDLC SERPL CALC-MCNC: 15 MG/DL

## 2025-07-14 VITALS — BODY MASS INDEX: 27.64 KG/M2 | WEIGHT: 181.8 LBS

## 2025-07-17 ENCOUNTER — TELEMEDICINE (OUTPATIENT)
Dept: BARIATRICS/WEIGHT MGMT | Age: 50
End: 2025-07-17
Payer: COMMERCIAL

## 2025-07-17 DIAGNOSIS — E66.3 OVERWEIGHT (BMI 25.0-29.9): Primary | ICD-10-CM

## 2025-07-17 DIAGNOSIS — Z71.3 DIETARY COUNSELING AND SURVEILLANCE: ICD-10-CM

## 2025-07-17 PROCEDURE — 99214 OFFICE O/P EST MOD 30 MIN: CPT | Performed by: FAMILY MEDICINE

## 2025-07-17 ASSESSMENT — ENCOUNTER SYMPTOMS
VOMITING: 0
NAUSEA: 0
PHOTOPHOBIA: 0
APNEA: 0
CONSTIPATION: 0
EYE PAIN: 0
ABDOMINAL PAIN: 0
ABDOMINAL DISTENTION: 0
BLOOD IN STOOL: 0
CHOKING: 0
DIARRHEA: 0
SHORTNESS OF BREATH: 0
CHEST TIGHTNESS: 0
WHEEZING: 0
COUGH: 0

## 2025-07-17 NOTE — PROGRESS NOTES
Patient: Bhavya Turk                      Encounter Date: 7/17/2025    YOB: 1975                Age: 50 y.o.    Chief Complaint   Patient presents with    Weight Management     F/u MWM          Patient identification was verified at the start of the visit.         4/29/2025    10:03 AM   Patient-Reported Vitals   Patient-Reported Weight 188   Patient-Reported Height 5'7.5\"         BP Readings from Last 1 Encounters:   12/23/24 138/80       BMI Readings from Last 1 Encounters:   07/14/25 27.64 kg/m²       Pulse Readings from Last 1 Encounters:   12/23/24 85          Wt Readings from Last 3 Encounters:   07/14/25 82.5 kg (181 lb 12.8 oz)   12/23/24 91.6 kg (202 lb)   12/12/24 91.6 kg (202 lb)        HPI: 49 y.o. female with a long-standing history of obesity s/p sleeve gastrectomy in June 2023 presents today for virtual video follow-up. She has lost 7 pounds since her last visit. Current treatment includes Wegovy 2.4 mg SC weekly and low carb/arie diet.  Continue to stay physically active.  Motivated to continue losing weight.    Labs reviewed with patient        Medication(s): Appetite well controlled?     [x]Yes      []No                          Focus:     [x]Good     []Fair     []Poor                          Side effects?  No     Any recent change in medication(s)? No           Allergies   Allergen Reactions    Morphine Itching    Cephalexin Diarrhea and Nausea And Vomiting         Current Outpatient Medications:     Semaglutide-Weight Management (WEGOVY) 2.4 MG/0.75ML SOAJ SC injection, Inject 2.4 mg into the skin every 7 days, Disp: 3 mL, Rfl: 2    amLODIPine (NORVASC) 5 MG tablet, Take 1 tablet by mouth daily, Disp: 90 tablet, Rfl: 3    omeprazole (PRILOSEC) 20 MG delayed release capsule, TAKE ONE CAPSULE BY MOUTH EVERY MORNING BEFORE BREAKFAST, Disp: 90 capsule, Rfl: 1    Patient Active Problem List   Diagnosis    Menorrhagia with regular cycle    Fibroid uterus    Adnexal mass    Chronic

## (undated) DEVICE — RELOAD STPL L60MM H1.5-3.6MM REG TISS BLU GRIPPING SURF B

## (undated) DEVICE — SOLUTION IV IRRIG WATER 500ML POUR BRL ST 2F7113

## (undated) DEVICE — DEVICE SUT W/ SZ 0 L48IN VLT POLYSRB SUT DISP ES-9 ENDO

## (undated) DEVICE — TROCAR SLEEVE: Brand: KII ® LOW PROFILE SLEEVE

## (undated) DEVICE — DRAPE,LAP,CHOLE,W/TROUGHS,STERILE: Brand: MEDLINE

## (undated) DEVICE — TROCARS: Brand: KII® OPTICAL ACCESS SYSTEM

## (undated) DEVICE — LOTION PREP REMV 5OZ IODO CLR TINC OF BENZ DURAPREP

## (undated) DEVICE — BLANKET WRM W29.9XL79.1IN UP BODY FORC AIR MISTRAL-AIR

## (undated) DEVICE — LIQUIBAND RAPID ADHESIVE 36/CS 0.8ML: Brand: MEDLINE

## (undated) DEVICE — LAPAROSCOPIC SCISSORS: Brand: EPIX LAPAROSCOPIC SCISSORS

## (undated) DEVICE — STAPLER 60MM POWERED ECHELON 3000 LONG 440MM

## (undated) DEVICE — SHEET,DRAPE,53X77,STERILE: Brand: MEDLINE

## (undated) DEVICE — CRADLE ANK AND FT ELEV FLAT END POLY FOAM W/ VENT H NEUT

## (undated) DEVICE — GOWN,AURORA,NONREINF,RAGLAN,XXL,STERILE: Brand: MEDLINE

## (undated) DEVICE — ENDOSCOPIC KIT 6X3/16 FT COLON W/ 1.1 OZ 2 GWN W/O BRSH

## (undated) DEVICE — NEEDLE,22GX1.5",REG,BEVEL: Brand: MEDLINE

## (undated) DEVICE — SHEET,DRAPE,40X58,STERILE: Brand: MEDLINE

## (undated) DEVICE — BOWL MED L 32OZ PLAS W/ MOLD GRAD EZ OPN PEEL PCH

## (undated) DEVICE — GLOVE ORANGE PI 8 1/2   MSG9085

## (undated) DEVICE — RELOAD STPL L60MM H1-2.6MM MESENTERY THN TISS WHT 6 ROW

## (undated) DEVICE — APPLICATOR PREP 26ML 0.7% IOD POVACRYLEX 74% ISO ALC ST

## (undated) DEVICE — SUTURE VCRL + SZ 4-0 L18IN ABSRB UD L19MM PS-2 3/8 CIR PRIM VCP496H

## (undated) DEVICE — LAPAROSCOPY PACK: Brand: MEDLINE INDUSTRIES, INC.

## (undated) DEVICE — MERCY FAIRFIELD TURNOVER KIT: Brand: MEDLINE INDUSTRIES, INC.

## (undated) DEVICE — NEEDLE INSUF L150MM DIA2MM DISP FOR PNEUMOPERI ENDOPATH

## (undated) DEVICE — VALVE SUCTION AIR H2O SET ORCA POD + DISP

## (undated) DEVICE — SUTURE VCRL PLUS SZ 0 54IN TIE VCP608H

## (undated) DEVICE — 30978 SEE SHARP - ENHANCED INTRAOPERATIVE LAPAROSCOPE CLEANING & DEFOGGING: Brand: 30978 SEE SHARP - ENHANCED INTRAOPERATIVE LAPAROSCOPE CLEANING & DEFOGGING

## (undated) DEVICE — SPONGE,LAP,4"X18",XR,ST,5/PK,40PK/CS: Brand: MEDLINE INDUSTRIES, INC.

## (undated) DEVICE — TRUE CONTENT TO BE POPULATED AS PART OF REBRANDING: Brand: ARGYLE

## (undated) DEVICE — Device

## (undated) DEVICE — AIR SHEET,LAT,COMFORT GLIDE, BLEND 40X80: Brand: MEDLINE

## (undated) DEVICE — FORCEPS BX L240CM WRK CHN 2.8MM STD CAP W/ NDL MIC MESH

## (undated) DEVICE — BW-412T DISP COMBO CLEANING BRUSH: Brand: SINGLE USE COMBINATION CLEANING BRUSH

## (undated) DEVICE — SOLUTION IRRIG 1000ML 0.9% SOD CHL USP POUR PLAS BTL

## (undated) DEVICE — TROCAR: Brand: KII OPTICAL ACCESS SYSTEM

## (undated) DEVICE — SHEARS ENDOSCP HARM 36CM ULTRASONIC CRV TIP UPGRD

## (undated) DEVICE — SYRINGE MED 10ML TRNSLUC BRL PLUNG BLK MRK POLYPR CTRL

## (undated) DEVICE — AIR/WATER CLEANING ADAPTER FOR OLYMPUS® GI ENDOSCOPE: Brand: BULLDOG®

## (undated) DEVICE — ARM CRADLE: Brand: DEVON

## (undated) DEVICE — DEVICE SUT SHFT L34CM DIA 10MM 2 JAW LD UNIT ENDOSTCH

## (undated) DEVICE — PASSIVE LAPSCP FLTR W/ 1/4INX24 TBNG AND M LUER LCK FIT - U

## (undated) DEVICE — MOUTHPIECE ENDOSCP L CTRL OPN AND SIDE PORTS DISP

## (undated) DEVICE — TROCAR: Brand: KII FIOS FIRST ENTRY